# Patient Record
Sex: MALE | Race: WHITE | Employment: UNEMPLOYED | URBAN - METROPOLITAN AREA
[De-identification: names, ages, dates, MRNs, and addresses within clinical notes are randomized per-mention and may not be internally consistent; named-entity substitution may affect disease eponyms.]

---

## 2017-01-01 ENCOUNTER — HOSPITAL ENCOUNTER (OUTPATIENT)
Facility: CLINIC | Age: 62
Discharge: HOME | End: 2017-03-09
Attending: INTERNAL MEDICINE | Admitting: INTERNAL MEDICINE
Payer: MEDICARE

## 2017-01-01 ENCOUNTER — CARE COORDINATION (OUTPATIENT)
Dept: CARDIOLOGY | Facility: CLINIC | Age: 62
End: 2017-01-01

## 2017-01-01 ENCOUNTER — TELEPHONE (OUTPATIENT)
Dept: TRANSPLANT | Facility: CLINIC | Age: 62
End: 2017-01-01

## 2017-01-01 ENCOUNTER — ALLIED HEALTH/NURSE VISIT (OUTPATIENT)
Dept: TRANSPLANT | Facility: CLINIC | Age: 62
End: 2017-01-01
Attending: INTERNAL MEDICINE
Payer: MEDICARE

## 2017-01-01 ENCOUNTER — HOSPITAL ENCOUNTER (OUTPATIENT)
Dept: CARDIOLOGY | Facility: CLINIC | Age: 62
Discharge: HOME | End: 2017-01-31
Attending: INTERNAL MEDICINE | Admitting: INTERNAL MEDICINE
Payer: MEDICARE

## 2017-01-01 ENCOUNTER — PRE VISIT (OUTPATIENT)
Dept: CARDIOLOGY | Facility: CLINIC | Age: 62
End: 2017-01-01

## 2017-01-01 ENCOUNTER — OFFICE VISIT (OUTPATIENT)
Dept: CARDIOLOGY | Facility: CLINIC | Age: 62
End: 2017-01-01
Attending: THORACIC SURGERY (CARDIOTHORACIC VASCULAR SURGERY)
Payer: MEDICARE

## 2017-01-01 ENCOUNTER — TEAM CONFERENCE (OUTPATIENT)
Dept: TRANSPLANT | Facility: CLINIC | Age: 62
End: 2017-01-01

## 2017-01-01 ENCOUNTER — HOSPITAL ENCOUNTER (OUTPATIENT)
Dept: CARDIOLOGY | Facility: CLINIC | Age: 62
Discharge: HOME | End: 2017-01-03
Attending: PHYSICIAN ASSISTANT | Admitting: PHYSICIAN ASSISTANT
Payer: MEDICARE

## 2017-01-01 ENCOUNTER — HOSPITAL ENCOUNTER (OUTPATIENT)
Dept: NUCLEAR MEDICINE | Facility: CLINIC | Age: 62
Setting detail: NUCLEAR MEDICINE
End: 2017-01-31
Attending: INTERNAL MEDICINE
Payer: MEDICARE

## 2017-01-01 ENCOUNTER — OFFICE VISIT (OUTPATIENT)
Dept: CARDIOLOGY | Facility: CLINIC | Age: 62
End: 2017-01-01
Attending: INTERNAL MEDICINE
Payer: MEDICARE

## 2017-01-01 ENCOUNTER — APPOINTMENT (OUTPATIENT)
Dept: MEDSURG UNIT | Facility: CLINIC | Age: 62
End: 2017-01-01
Attending: INTERNAL MEDICINE
Payer: MEDICARE

## 2017-01-01 ENCOUNTER — DOCUMENTATION ONLY (OUTPATIENT)
Dept: TRANSPLANT | Facility: CLINIC | Age: 62
End: 2017-01-01

## 2017-01-01 VITALS
BODY MASS INDEX: 23.71 KG/M2 | DIASTOLIC BLOOD PRESSURE: 74 MMHG | HEIGHT: 69 IN | HEART RATE: 89 BPM | OXYGEN SATURATION: 93 % | SYSTOLIC BLOOD PRESSURE: 123 MMHG | WEIGHT: 160.1 LBS

## 2017-01-01 VITALS
HEIGHT: 69 IN | TEMPERATURE: 96.1 F | WEIGHT: 150 LBS | BODY MASS INDEX: 22.22 KG/M2 | OXYGEN SATURATION: 94 % | HEART RATE: 96 BPM | RESPIRATION RATE: 18 BRPM | SYSTOLIC BLOOD PRESSURE: 140 MMHG | DIASTOLIC BLOOD PRESSURE: 86 MMHG

## 2017-01-01 VITALS
HEIGHT: 69 IN | HEART RATE: 93 BPM | DIASTOLIC BLOOD PRESSURE: 87 MMHG | BODY MASS INDEX: 23.33 KG/M2 | WEIGHT: 157.5 LBS | OXYGEN SATURATION: 95 % | SYSTOLIC BLOOD PRESSURE: 153 MMHG

## 2017-01-01 DIAGNOSIS — I25.10 CORONARY ARTERY DISEASE DUE TO LIPID RICH PLAQUE: ICD-10-CM

## 2017-01-01 DIAGNOSIS — Z76.82 ORGAN TRANSPLANT CANDIDATE: Primary | ICD-10-CM

## 2017-01-01 DIAGNOSIS — N18.6 END STAGE RENAL DISEASE (H): Primary | ICD-10-CM

## 2017-01-01 DIAGNOSIS — Z76.82 AWAITING ORGAN TRANSPLANT: Primary | ICD-10-CM

## 2017-01-01 DIAGNOSIS — Z01.810 PRE-OPERATIVE CARDIOVASCULAR EXAMINATION: ICD-10-CM

## 2017-01-01 DIAGNOSIS — Z01.818 PRE-TRANSPLANT EVALUATION FOR KIDNEY TRANSPLANT: ICD-10-CM

## 2017-01-01 DIAGNOSIS — N18.6 END STAGE RENAL DISEASE (H): ICD-10-CM

## 2017-01-01 DIAGNOSIS — Z98.890 STATUS POST CORONARY ANGIOGRAM: ICD-10-CM

## 2017-01-01 DIAGNOSIS — Z76.82 ORGAN TRANSPLANT CANDIDATE: ICD-10-CM

## 2017-01-01 DIAGNOSIS — I25.83 CORONARY ARTERY DISEASE DUE TO LIPID RICH PLAQUE: ICD-10-CM

## 2017-01-01 DIAGNOSIS — I25.10 CORONARY ARTERY DISEASE INVOLVING NATIVE CORONARY ARTERY OF NATIVE HEART WITHOUT ANGINA PECTORIS: Primary | ICD-10-CM

## 2017-01-01 DIAGNOSIS — Z12.5 PROSTATE CANCER SCREENING: ICD-10-CM

## 2017-01-01 DIAGNOSIS — R94.39 ABNORMAL CARDIOVASCULAR STRESS TEST: ICD-10-CM

## 2017-01-01 DIAGNOSIS — R94.39 ABNORMAL CARDIOVASCULAR STRESS TEST: Primary | ICD-10-CM

## 2017-01-01 DIAGNOSIS — I25.83 CORONARY ATHEROSCLEROSIS DUE TO LIPID RICH PLAQUE: ICD-10-CM

## 2017-01-01 LAB
ANION GAP SERPL CALCULATED.3IONS-SCNC: 11 MMOL/L (ref 3–14)
APTT PPP: 31 SEC (ref 22–37)
BUN SERPL-MCNC: 34 MG/DL (ref 7–30)
CALCIUM SERPL-MCNC: 8.8 MG/DL (ref 8.5–10.1)
CHLORIDE SERPL-SCNC: 98 MMOL/L (ref 94–109)
CO2 SERPL-SCNC: 31 MMOL/L (ref 20–32)
CREAT SERPL-MCNC: 5 MG/DL (ref 0.66–1.25)
ERYTHROCYTE [DISTWIDTH] IN BLOOD BY AUTOMATED COUNT: 16.6 % (ref 10–15)
GFR SERPL CREATININE-BSD FRML MDRD: 12 ML/MIN/1.7M2
GLUCOSE SERPL-MCNC: 77 MG/DL (ref 70–99)
HCT VFR BLD AUTO: 36.6 % (ref 40–53)
HGB BLD-MCNC: 12 G/DL (ref 13.3–17.7)
INR PPP: 1.21 (ref 0.86–1.14)
INTERPRETATION ECG - MUSE: NORMAL
KCT BLD-ACNC: 173 SEC (ref 105–167)
KCT BLD-ACNC: 196 SEC (ref 105–167)
KCT BLD-ACNC: 257 SEC (ref 105–167)
MCH RBC QN AUTO: 33.9 PG (ref 26.5–33)
MCHC RBC AUTO-ENTMCNC: 32.8 G/DL (ref 31.5–36.5)
MCV RBC AUTO: 103 FL (ref 78–100)
PLATELET # BLD AUTO: 229 10E9/L (ref 150–450)
POTASSIUM SERPL-SCNC: 4.8 MMOL/L (ref 3.4–5.3)
RBC # BLD AUTO: 3.54 10E12/L (ref 4.4–5.9)
SODIUM SERPL-SCNC: 140 MMOL/L (ref 133–144)
WBC # BLD AUTO: 5.1 10E9/L (ref 4–11)

## 2017-01-01 PROCEDURE — 27210742 ZZH CATH CR1

## 2017-01-01 PROCEDURE — 78452 HT MUSCLE IMAGE SPECT MULT: CPT

## 2017-01-01 PROCEDURE — 78452 HT MUSCLE IMAGE SPECT MULT: CPT | Performed by: INTERNAL MEDICINE

## 2017-01-01 PROCEDURE — 40000141 ZZH STATISTIC PERIPHERAL IV START W/O US GUIDANCE

## 2017-01-01 PROCEDURE — 25000125 ZZHC RX 250: Performed by: INTERNAL MEDICINE

## 2017-01-01 PROCEDURE — 93571 IV DOP VEL&/PRESS C FLO 1ST: CPT

## 2017-01-01 PROCEDURE — 27211089 ZZH KIT ACIST INJECTOR CR3

## 2017-01-01 PROCEDURE — C1769 GUIDE WIRE: HCPCS

## 2017-01-01 PROCEDURE — 27210856 ZZH ACCESS HEART CATH CR2

## 2017-01-01 PROCEDURE — 40000264 ECHO DOBUTAMINE STRESS TEST WITH DEFINITY

## 2017-01-01 PROCEDURE — 93572 IV DOP VEL&/PRESS C FLO EA: CPT

## 2017-01-01 PROCEDURE — 25000128 H RX IP 250 OP 636: Performed by: INTERNAL MEDICINE

## 2017-01-01 PROCEDURE — 85027 COMPLETE CBC AUTOMATED: CPT | Performed by: INTERNAL MEDICINE

## 2017-01-01 PROCEDURE — 34300033 ZZH RX 343: Performed by: INTERNAL MEDICINE

## 2017-01-01 PROCEDURE — 93325 DOPPLER ECHO COLOR FLOW MAPG: CPT | Mod: 26 | Performed by: INTERNAL MEDICINE

## 2017-01-01 PROCEDURE — 93018 CV STRESS TEST I&R ONLY: CPT | Performed by: INTERNAL MEDICINE

## 2017-01-01 PROCEDURE — 4A033BC MEASUREMENT OF ARTERIAL PRESSURE, CORONARY, PERCUTANEOUS APPROACH: ICD-10-PCS | Performed by: INTERNAL MEDICINE

## 2017-01-01 PROCEDURE — 25500064 ZZH RX 255 OP 636: Performed by: INTERNAL MEDICINE

## 2017-01-01 PROCEDURE — C1887 CATHETER, GUIDING: HCPCS

## 2017-01-01 PROCEDURE — 99214 OFFICE O/P EST MOD 30 MIN: CPT | Mod: 25 | Performed by: INTERNAL MEDICINE

## 2017-01-01 PROCEDURE — 99213 OFFICE O/P EST LOW 20 MIN: CPT | Mod: 25,ZF

## 2017-01-01 PROCEDURE — 93017 CV STRESS TEST TRACING ONLY: CPT

## 2017-01-01 PROCEDURE — 93321 DOPPLER ECHO F-UP/LMTD STD: CPT | Mod: 26 | Performed by: INTERNAL MEDICINE

## 2017-01-01 PROCEDURE — 99213 OFFICE O/P EST LOW 20 MIN: CPT | Mod: ZF

## 2017-01-01 PROCEDURE — 85610 PROTHROMBIN TIME: CPT | Performed by: INTERNAL MEDICINE

## 2017-01-01 PROCEDURE — B2111ZZ FLUOROSCOPY OF MULTIPLE CORONARY ARTERIES USING LOW OSMOLAR CONTRAST: ICD-10-PCS | Performed by: INTERNAL MEDICINE

## 2017-01-01 PROCEDURE — 99153 MOD SED SAME PHYS/QHP EA: CPT | Performed by: INTERNAL MEDICINE

## 2017-01-01 PROCEDURE — 80048 BASIC METABOLIC PNL TOTAL CA: CPT | Performed by: INTERNAL MEDICINE

## 2017-01-01 PROCEDURE — C1894 INTRO/SHEATH, NON-LASER: HCPCS

## 2017-01-01 PROCEDURE — 85347 COAGULATION TIME ACTIVATED: CPT

## 2017-01-01 PROCEDURE — 93350 STRESS TTE ONLY: CPT | Mod: 26 | Performed by: INTERNAL MEDICINE

## 2017-01-01 PROCEDURE — 27210946 ZZH KIT HC TOTES DISP CR8

## 2017-01-01 PROCEDURE — 93010 ELECTROCARDIOGRAM REPORT: CPT | Performed by: INTERNAL MEDICINE

## 2017-01-01 PROCEDURE — 93454 CORONARY ARTERY ANGIO S&I: CPT | Mod: 26 | Performed by: INTERNAL MEDICINE

## 2017-01-01 PROCEDURE — 40000172 ZZH STATISTIC PROCEDURE PREP ONLY

## 2017-01-01 PROCEDURE — 99152 MOD SED SAME PHYS/QHP 5/>YRS: CPT

## 2017-01-01 PROCEDURE — 40000065 ZZH STATISTIC EKG NON-CHARGEABLE

## 2017-01-01 PROCEDURE — 93016 CV STRESS TEST SUPVJ ONLY: CPT | Performed by: INTERNAL MEDICINE

## 2017-01-01 PROCEDURE — 27210807 ZZH SHEATH CR6

## 2017-01-01 PROCEDURE — 93571 IV DOP VEL&/PRESS C FLO 1ST: CPT | Mod: 26 | Performed by: INTERNAL MEDICINE

## 2017-01-01 PROCEDURE — 85730 THROMBOPLASTIN TIME PARTIAL: CPT | Performed by: INTERNAL MEDICINE

## 2017-01-01 PROCEDURE — 93454 CORONARY ARTERY ANGIO S&I: CPT

## 2017-01-01 PROCEDURE — 99152 MOD SED SAME PHYS/QHP 5/>YRS: CPT | Performed by: INTERNAL MEDICINE

## 2017-01-01 PROCEDURE — 99153 MOD SED SAME PHYS/QHP EA: CPT

## 2017-01-01 PROCEDURE — A9502 TC99M TETROFOSMIN: HCPCS | Performed by: INTERNAL MEDICINE

## 2017-01-01 PROCEDURE — 27210787 ZZH MANIFOLD CR2

## 2017-01-01 RX ORDER — DEXTROSE MONOHYDRATE 25 G/50ML
12.5-5 INJECTION, SOLUTION INTRAVENOUS EVERY 30 MIN PRN
Status: DISCONTINUED | OUTPATIENT
Start: 2017-01-01 | End: 2017-01-01 | Stop reason: HOSPADM

## 2017-01-01 RX ORDER — FLUMAZENIL 0.1 MG/ML
0.2 INJECTION, SOLUTION INTRAVENOUS
Status: DISCONTINUED | OUTPATIENT
Start: 2017-01-01 | End: 2017-01-01 | Stop reason: HOSPADM

## 2017-01-01 RX ORDER — ASPIRIN 81 MG/1
81 TABLET ORAL DAILY
Status: DISCONTINUED | OUTPATIENT
Start: 2017-01-01 | End: 2017-01-01 | Stop reason: HOSPADM

## 2017-01-01 RX ORDER — ASPIRIN 325 MG
325 TABLET ORAL
Status: DISCONTINUED | OUTPATIENT
Start: 2017-01-01 | End: 2017-01-01 | Stop reason: HOSPADM

## 2017-01-01 RX ORDER — DOPAMINE HYDROCHLORIDE 160 MG/100ML
2-20 INJECTION, SOLUTION INTRAVENOUS CONTINUOUS PRN
Status: DISCONTINUED | OUTPATIENT
Start: 2017-01-01 | End: 2017-01-01 | Stop reason: HOSPADM

## 2017-01-01 RX ORDER — PRAVASTATIN SODIUM 20 MG
20 TABLET ORAL EVERY EVENING
Qty: 30 TABLET | Refills: 1 | Status: SHIPPED
Start: 2017-01-01

## 2017-01-01 RX ORDER — SODIUM CHLORIDE 9 MG/ML
INJECTION, SOLUTION INTRAVENOUS CONTINUOUS
Status: DISCONTINUED | OUTPATIENT
Start: 2017-01-01 | End: 2017-01-01 | Stop reason: HOSPADM

## 2017-01-01 RX ORDER — IOPAMIDOL 755 MG/ML
150 INJECTION, SOLUTION INTRAVASCULAR ONCE
Status: COMPLETED | OUTPATIENT
Start: 2017-01-01 | End: 2017-01-01

## 2017-01-01 RX ORDER — CLOPIDOGREL BISULFATE 75 MG/1
300-600 TABLET ORAL
Status: DISCONTINUED | OUTPATIENT
Start: 2017-01-01 | End: 2017-01-01 | Stop reason: HOSPADM

## 2017-01-01 RX ORDER — NITROGLYCERIN 5 MG/ML
100-500 VIAL (ML) INTRAVENOUS
Status: DISCONTINUED | OUTPATIENT
Start: 2017-01-01 | End: 2017-01-01 | Stop reason: HOSPADM

## 2017-01-01 RX ORDER — REGADENOSON 0.08 MG/ML
0.4 INJECTION, SOLUTION INTRAVENOUS ONCE
Status: COMPLETED | OUTPATIENT
Start: 2017-01-01 | End: 2017-01-01

## 2017-01-01 RX ORDER — SODIUM CHLORIDE 9 MG/ML
INJECTION, SOLUTION INTRAVENOUS CONTINUOUS
Status: CANCELLED | OUTPATIENT
Start: 2017-01-01

## 2017-01-01 RX ORDER — POTASSIUM CHLORIDE 7.45 MG/ML
10 INJECTION INTRAVENOUS
Status: DISCONTINUED | OUTPATIENT
Start: 2017-01-01 | End: 2017-01-01 | Stop reason: HOSPADM

## 2017-01-01 RX ORDER — ARGATROBAN 1 MG/ML
350 INJECTION, SOLUTION INTRAVENOUS
Status: DISCONTINUED | OUTPATIENT
Start: 2017-01-01 | End: 2017-01-01 | Stop reason: HOSPADM

## 2017-01-01 RX ORDER — HEPARIN SODIUM 1000 [USP'U]/ML
1000-10000 INJECTION, SOLUTION INTRAVENOUS; SUBCUTANEOUS EVERY 5 MIN PRN
Status: DISCONTINUED | OUTPATIENT
Start: 2017-01-01 | End: 2017-01-01 | Stop reason: HOSPADM

## 2017-01-01 RX ORDER — PROTAMINE SULFATE 10 MG/ML
1-5 INJECTION, SOLUTION INTRAVENOUS
Status: DISCONTINUED | OUTPATIENT
Start: 2017-01-01 | End: 2017-01-01 | Stop reason: HOSPADM

## 2017-01-01 RX ORDER — ALBUTEROL SULFATE 90 UG/1
2 AEROSOL, METERED RESPIRATORY (INHALATION) EVERY 5 MIN PRN
Status: DISCONTINUED | OUTPATIENT
Start: 2017-01-01 | End: 2017-01-01 | Stop reason: HOSPADM

## 2017-01-01 RX ORDER — ARGATROBAN 1 MG/ML
150 INJECTION, SOLUTION INTRAVENOUS
Status: DISCONTINUED | OUTPATIENT
Start: 2017-01-01 | End: 2017-01-01 | Stop reason: HOSPADM

## 2017-01-01 RX ORDER — PROTAMINE SULFATE 10 MG/ML
25-100 INJECTION, SOLUTION INTRAVENOUS EVERY 5 MIN PRN
Status: DISCONTINUED | OUTPATIENT
Start: 2017-01-01 | End: 2017-01-01 | Stop reason: HOSPADM

## 2017-01-01 RX ORDER — NALOXONE HYDROCHLORIDE 0.4 MG/ML
.1-.4 INJECTION, SOLUTION INTRAMUSCULAR; INTRAVENOUS; SUBCUTANEOUS
Status: DISCONTINUED | OUTPATIENT
Start: 2017-01-01 | End: 2017-01-01 | Stop reason: HOSPADM

## 2017-01-01 RX ORDER — LORAZEPAM 2 MG/ML
.5-2 INJECTION INTRAMUSCULAR EVERY 4 HOURS PRN
Status: DISCONTINUED | OUTPATIENT
Start: 2017-01-01 | End: 2017-01-01 | Stop reason: HOSPADM

## 2017-01-01 RX ORDER — EPTIFIBATIDE 2 MG/ML
180 INJECTION, SOLUTION INTRAVENOUS EVERY 10 MIN PRN
Status: DISCONTINUED | OUTPATIENT
Start: 2017-01-01 | End: 2017-01-01 | Stop reason: HOSPADM

## 2017-01-01 RX ORDER — ONDANSETRON 2 MG/ML
4 INJECTION INTRAMUSCULAR; INTRAVENOUS EVERY 4 HOURS PRN
Status: DISCONTINUED | OUTPATIENT
Start: 2017-01-01 | End: 2017-01-01 | Stop reason: HOSPADM

## 2017-01-01 RX ORDER — METHYLPREDNISOLONE SODIUM SUCCINATE 125 MG/2ML
125 INJECTION, POWDER, LYOPHILIZED, FOR SOLUTION INTRAMUSCULAR; INTRAVENOUS
Status: DISCONTINUED | OUTPATIENT
Start: 2017-01-01 | End: 2017-01-01 | Stop reason: HOSPADM

## 2017-01-01 RX ORDER — ENALAPRILAT 1.25 MG/ML
1.25-2.5 INJECTION INTRAVENOUS
Status: DISCONTINUED | OUTPATIENT
Start: 2017-01-01 | End: 2017-01-01 | Stop reason: HOSPADM

## 2017-01-01 RX ORDER — ASPIRIN 81 MG/1
81-324 TABLET, CHEWABLE ORAL
Status: DISCONTINUED | OUTPATIENT
Start: 2017-01-01 | End: 2017-01-01 | Stop reason: HOSPADM

## 2017-01-01 RX ORDER — FUROSEMIDE 10 MG/ML
20-100 INJECTION INTRAMUSCULAR; INTRAVENOUS
Status: DISCONTINUED | OUTPATIENT
Start: 2017-01-01 | End: 2017-01-01 | Stop reason: HOSPADM

## 2017-01-01 RX ORDER — NITROGLYCERIN 5 MG/ML
100-200 VIAL (ML) INTRAVENOUS
Status: DISCONTINUED | OUTPATIENT
Start: 2017-01-01 | End: 2017-01-01 | Stop reason: HOSPADM

## 2017-01-01 RX ORDER — ADENOSINE 3 MG/ML
12-12000 INJECTION, SOLUTION INTRAVENOUS
Status: DISCONTINUED | OUTPATIENT
Start: 2017-01-01 | End: 2017-01-01 | Stop reason: HOSPADM

## 2017-01-01 RX ORDER — PRAVASTATIN SODIUM 20 MG
20 TABLET ORAL EVERY EVENING
Status: DISCONTINUED | OUTPATIENT
Start: 2017-01-01 | End: 2017-01-01 | Stop reason: HOSPADM

## 2017-01-01 RX ORDER — NITROGLYCERIN 20 MG/100ML
.07-2 INJECTION INTRAVENOUS CONTINUOUS PRN
Status: DISCONTINUED | OUTPATIENT
Start: 2017-01-01 | End: 2017-01-01 | Stop reason: HOSPADM

## 2017-01-01 RX ORDER — NALOXONE HYDROCHLORIDE 0.4 MG/ML
.2-.4 INJECTION, SOLUTION INTRAMUSCULAR; INTRAVENOUS; SUBCUTANEOUS
Status: DISCONTINUED | OUTPATIENT
Start: 2017-01-01 | End: 2017-01-01 | Stop reason: HOSPADM

## 2017-01-01 RX ORDER — POTASSIUM CHLORIDE 29.8 MG/ML
20 INJECTION INTRAVENOUS
Status: DISCONTINUED | OUTPATIENT
Start: 2017-01-01 | End: 2017-01-01 | Stop reason: HOSPADM

## 2017-01-01 RX ORDER — DOBUTAMINE HYDROCHLORIDE 200 MG/100ML
2-20 INJECTION INTRAVENOUS CONTINUOUS PRN
Status: DISCONTINUED | OUTPATIENT
Start: 2017-01-01 | End: 2017-01-01 | Stop reason: HOSPADM

## 2017-01-01 RX ORDER — LIDOCAINE HYDROCHLORIDE 10 MG/ML
30 INJECTION, SOLUTION EPIDURAL; INFILTRATION; INTRACAUDAL; PERINEURAL
Status: DISCONTINUED | OUTPATIENT
Start: 2017-01-01 | End: 2017-01-01 | Stop reason: HOSPADM

## 2017-01-01 RX ORDER — DOBUTAMINE HYDROCHLORIDE 200 MG/100ML
5-40 INJECTION INTRAVENOUS CONTINUOUS PRN
Status: COMPLETED | OUTPATIENT
Start: 2017-01-01 | End: 2017-01-01

## 2017-01-01 RX ORDER — PRASUGREL 10 MG/1
10-60 TABLET, FILM COATED ORAL
Status: DISCONTINUED | OUTPATIENT
Start: 2017-01-01 | End: 2017-01-01 | Stop reason: HOSPADM

## 2017-01-01 RX ORDER — NICARDIPINE HYDROCHLORIDE 2.5 MG/ML
100 INJECTION INTRAVENOUS
Status: DISCONTINUED | OUTPATIENT
Start: 2017-01-01 | End: 2017-01-01 | Stop reason: HOSPADM

## 2017-01-01 RX ORDER — AMINOPHYLLINE 25 MG/ML
50-100 INJECTION, SOLUTION INTRAVENOUS
Status: DISCONTINUED | OUTPATIENT
Start: 2017-01-01 | End: 2017-01-01 | Stop reason: HOSPADM

## 2017-01-01 RX ORDER — HYDROCODONE BITARTRATE AND ACETAMINOPHEN 5; 325 MG/1; MG/1
1-2 TABLET ORAL EVERY 4 HOURS PRN
Status: DISCONTINUED | OUTPATIENT
Start: 2017-01-01 | End: 2017-01-01 | Stop reason: HOSPADM

## 2017-01-01 RX ORDER — VERAPAMIL HYDROCHLORIDE 2.5 MG/ML
1-2.5 INJECTION, SOLUTION INTRAVENOUS
Status: DISCONTINUED | OUTPATIENT
Start: 2017-01-01 | End: 2017-01-01 | Stop reason: HOSPADM

## 2017-01-01 RX ORDER — SODIUM NITROPRUSSIDE 25 MG/ML
100-200 INJECTION INTRAVENOUS
Status: DISCONTINUED | OUTPATIENT
Start: 2017-01-01 | End: 2017-01-01 | Stop reason: HOSPADM

## 2017-01-01 RX ORDER — ACETAMINOPHEN 325 MG/1
325-650 TABLET ORAL EVERY 4 HOURS PRN
Status: DISCONTINUED | OUTPATIENT
Start: 2017-01-01 | End: 2017-01-01 | Stop reason: HOSPADM

## 2017-01-01 RX ORDER — FENTANYL CITRATE 50 UG/ML
25-50 INJECTION, SOLUTION INTRAMUSCULAR; INTRAVENOUS
Status: DISCONTINUED | OUTPATIENT
Start: 2017-01-01 | End: 2017-01-01 | Stop reason: HOSPADM

## 2017-01-01 RX ORDER — LIDOCAINE 40 MG/G
CREAM TOPICAL
Status: DISCONTINUED | OUTPATIENT
Start: 2017-01-01 | End: 2017-01-01 | Stop reason: HOSPADM

## 2017-01-01 RX ORDER — ACYCLOVIR 200 MG/1
0-1 CAPSULE ORAL
Status: DISCONTINUED | OUTPATIENT
Start: 2017-01-01 | End: 2017-01-01 | Stop reason: HOSPADM

## 2017-01-01 RX ORDER — PROMETHAZINE HYDROCHLORIDE 25 MG/ML
6.25-25 INJECTION, SOLUTION INTRAMUSCULAR; INTRAVENOUS EVERY 4 HOURS PRN
Status: DISCONTINUED | OUTPATIENT
Start: 2017-01-01 | End: 2017-01-01 | Stop reason: HOSPADM

## 2017-01-01 RX ORDER — NIFEDIPINE 10 MG/1
10 CAPSULE ORAL
Status: DISCONTINUED | OUTPATIENT
Start: 2017-01-01 | End: 2017-01-01 | Stop reason: HOSPADM

## 2017-01-01 RX ORDER — PHENYLEPHRINE HCL IN 0.9% NACL 1 MG/10 ML
20-100 SYRINGE (ML) INTRAVENOUS
Status: DISCONTINUED | OUTPATIENT
Start: 2017-01-01 | End: 2017-01-01 | Stop reason: HOSPADM

## 2017-01-01 RX ORDER — EPTIFIBATIDE 2 MG/ML
2 INJECTION, SOLUTION INTRAVENOUS CONTINUOUS PRN
Status: DISCONTINUED | OUTPATIENT
Start: 2017-01-01 | End: 2017-01-01 | Stop reason: HOSPADM

## 2017-01-01 RX ORDER — LIDOCAINE 40 MG/G
CREAM TOPICAL
Status: CANCELLED | OUTPATIENT
Start: 2017-01-01

## 2017-01-01 RX ORDER — NALOXONE HYDROCHLORIDE 0.4 MG/ML
0.4 INJECTION, SOLUTION INTRAMUSCULAR; INTRAVENOUS; SUBCUTANEOUS EVERY 5 MIN PRN
Status: DISCONTINUED | OUTPATIENT
Start: 2017-01-01 | End: 2017-01-01 | Stop reason: HOSPADM

## 2017-01-01 RX ORDER — HYDRALAZINE HYDROCHLORIDE 20 MG/ML
10-20 INJECTION INTRAMUSCULAR; INTRAVENOUS
Status: DISCONTINUED | OUTPATIENT
Start: 2017-01-01 | End: 2017-01-01 | Stop reason: HOSPADM

## 2017-01-01 RX ORDER — DIPHENHYDRAMINE HYDROCHLORIDE 50 MG/ML
25-50 INJECTION INTRAMUSCULAR; INTRAVENOUS
Status: DISCONTINUED | OUTPATIENT
Start: 2017-01-01 | End: 2017-01-01 | Stop reason: HOSPADM

## 2017-01-01 RX ORDER — NITROGLYCERIN 0.4 MG/1
0.4 TABLET SUBLINGUAL EVERY 5 MIN PRN
Status: DISCONTINUED | OUTPATIENT
Start: 2017-01-01 | End: 2017-01-01 | Stop reason: HOSPADM

## 2017-01-01 RX ORDER — CLOPIDOGREL BISULFATE 75 MG/1
75 TABLET ORAL
Status: DISCONTINUED | OUTPATIENT
Start: 2017-01-01 | End: 2017-01-01 | Stop reason: HOSPADM

## 2017-01-01 RX ADMIN — IOPAMIDOL 180 ML: 755 INJECTION, SOLUTION INTRAVASCULAR at 12:30

## 2017-01-01 RX ADMIN — FENTANYL CITRATE 50 MCG: 50 INJECTION, SOLUTION INTRAMUSCULAR; INTRAVENOUS at 16:33

## 2017-01-01 RX ADMIN — TETROFOSMIN 11 MCI.: 0.23 INJECTION, POWDER, LYOPHILIZED, FOR SOLUTION INTRAVENOUS at 10:04

## 2017-01-01 RX ADMIN — SODIUM CHLORIDE: 900 INJECTION, SOLUTION INTRAVENOUS at 14:38

## 2017-01-01 RX ADMIN — DOBUTAMINE HYDROCHLORIDE 30 MCG/KG/MIN: 200 INJECTION INTRAVENOUS at 11:32

## 2017-01-01 RX ADMIN — FENTANYL CITRATE 50 MCG: 50 INJECTION, SOLUTION INTRAMUSCULAR; INTRAVENOUS at 12:59

## 2017-01-01 RX ADMIN — ATROPINE SULFATE 2 MG: 0.4 INJECTION, SOLUTION INTRAMUSCULAR; INTRAVENOUS; SUBCUTANEOUS at 11:42

## 2017-01-01 RX ADMIN — FENTANYL CITRATE 50 MCG: 50 INJECTION, SOLUTION INTRAMUSCULAR; INTRAVENOUS at 12:43

## 2017-01-01 RX ADMIN — NITROGLYCERIN 200 MCG: 5 INJECTION, SOLUTION INTRAVENOUS at 12:58

## 2017-01-01 RX ADMIN — MIDAZOLAM 1 MG: 1 INJECTION INTRAMUSCULAR; INTRAVENOUS at 16:13

## 2017-01-01 RX ADMIN — METOPROLOL TARTRATE 2 MG: 5 INJECTION INTRAVENOUS at 11:44

## 2017-01-01 RX ADMIN — Medication 5400 UNITS: at 13:15

## 2017-01-01 RX ADMIN — REGADENOSON 0.4 MG: 0.08 INJECTION, SOLUTION INTRAVENOUS at 11:05

## 2017-01-01 RX ADMIN — Medication 4000 UNITS: at 13:28

## 2017-01-01 RX ADMIN — MIDAZOLAM HYDROCHLORIDE 1 MG: 1 INJECTION, SOLUTION INTRAMUSCULAR; INTRAVENOUS at 13:23

## 2017-01-01 RX ADMIN — MIDAZOLAM HYDROCHLORIDE 1 MG: 1 INJECTION, SOLUTION INTRAMUSCULAR; INTRAVENOUS at 12:43

## 2017-01-01 RX ADMIN — TETROFOSMIN 41 MCI.: 0.23 INJECTION, POWDER, LYOPHILIZED, FOR SOLUTION INTRAVENOUS at 12:12

## 2017-01-01 RX ADMIN — SODIUM CHLORIDE: 900 INJECTION, SOLUTION INTRAVENOUS at 11:43

## 2017-01-01 RX ADMIN — PERFLUTREN 5 ML: 6.52 INJECTION, SUSPENSION INTRAVENOUS at 11:47

## 2017-01-01 RX ADMIN — FENTANYL CITRATE 50 MCG: 50 INJECTION, SOLUTION INTRAMUSCULAR; INTRAVENOUS at 16:09

## 2017-01-01 ASSESSMENT — PAIN SCALES - GENERAL
PAINLEVEL: NO PAIN (0)
PAINLEVEL: NO PAIN (0)

## 2017-01-03 NOTE — NURSING NOTE
Chief Complaint   Patient presents with     Eval/Assessment     Cardiac prior to possible kidney transplant   note- Patients BP is elevated today; patient did take his BP med this morning

## 2017-01-03 NOTE — PROGRESS NOTES
SUBJECTIVE:  Mauricio Galo is a 61 year old male who presents for evaluation to maintain renal Tx waitlist status.  CKD stage 5 of unclear etiology. No DM. Heavy smoker.  Known PAD. Known CVA.  Currently active with no symptoms.  HIV positive for long time.    Patient Active Problem List    Diagnosis Date Noted     HTN (hypertension)      Priority: Medium     End stage kidney disease (H)      Priority: Medium     Kidney transplant status, cadaveric 03/17/2014     Priority: Medium     HCV (hepatitis C virus) 03/17/2014     Priority: Medium     ESRD (end stage renal disease) (H) 03/17/2014     Priority: Medium     Organ transplant candidate 09/19/2013     Priority: Medium     CKD (chronic kidney disease) 07/26/2013     Priority: Medium     HIV (human immunodeficiency virus infection) (H) 07/26/2013     Priority: Medium     COPD (chronic obstructive pulmonary disease) (H) 07/26/2013     Priority: Medium     PVD (peripheral vascular disease) (H) 07/26/2013     Priority: Medium     Anemia 07/26/2013     Priority: Medium     Hx SBO 08/01/2012     Priority: Medium     recurrent       Thrombocytosis (H) 05/01/2012     Priority: Medium     Wound dehiscence 05/01/2012     Priority: Medium    .  Current Outpatient Prescriptions   Medication Sig     lamiVUDine (EPIVIR) 10 MG/ML solution 10mg/ml oral solution:  noah instructed to take as follows: 2.5ml by mouth every day.     diltiazem (CARTIA XT) 180 MG 24 hr CD capsule Take by mouth daily      rilpivirine (EDURANT) 25 MG tablet Take 2.5 mg by mouth daily      abacavir (ZIAGEN) 300 MG tablet Take 300 mg by mouth 2 times daily     metoprolol (TOPROL-XL) 50 MG 24 hr tablet Take 50 mg by mouth daily.     [DISCONTINUED] NEW MED 2 tablet daily for HIV, Unsure med name     No current facility-administered medications for this visit.     Past Medical History   Diagnosis Date     CVA (cerebral infarction) 4/30/2012     complete occlusion of the proximal left internal carotid  "artery with distal reconstitution via the left opthalmic artery     HIV (human immunodeficiency virus infection) (H) dx 4/16/1997     started meds 6/24/1997     PVD (peripheral vascular disease) (H) 8/22/2006     HTN (hypertension)      CKD (chronic kidney disease) stage 3, GFR 30-59 ml/min 01/25/2012     COPD (chronic obstructive pulmonary disease) (H)      Anemia 4/30/2012     Thrombocytosis (H) 5/01/2012     Wound dehiscence 5/01/2012     Hx SBO 8/01/2012     recurrent     End stage kidney disease (H)      Anxiety      Past Surgical History   Procedure Laterality Date     Bypass graft iliac to femoral  4/5/2012     Right common iliac artery endarterectomy  4/2012     Exploratory laparotomy, small bowel resection  8/2012     Allergies   Allergen Reactions     No Known Drug Allergy      Social History     Social History     Marital Status: Single     Spouse Name: N/A     Number of Children: N/A     Years of Education: N/A     Occupational History     Not on file.     Social History Main Topics     Smoking status: Current Some Day Smoker -- 1.00 packs/day for 0 years     Types: Cigarettes     Smokeless tobacco: Never Used      Comment: stopped 2 weeks ago     Alcohol Use: No     Drug Use: No     Sexual Activity: Not on file     Other Topics Concern     Not on file     Social History Narrative     No family history on file.       REVIEW OF SYSTEMS:  General: negative, fever, chills, night sweats  Skin: negative, acne, rash and scaling  Eyes: negative, double vision, eye pain and photophobia  Ears/Nose/Throat: negative, nasal congestion and purulent rhinorrhea  Respiratory: No dyspnea on exertion, No cough, No hemoptysis and negative  Cardiovascular: negative, palpitations, tachycardia, irregular heart beat, chest pain, exertional chest pain or pressure, paroxysmal nocturnal dyspnea, dyspnea on exertion and orthopnea         OBJECTIVE:  Blood pressure 123/74, pulse 89, height 1.753 m (5' 9\"), weight 72.621 kg (160 lb " 1.6 oz), SpO2 93 %.  General Appearance: alert, active and no distress  Head: Normocephalic. No masses, lesions, tenderness or abnormalities  Eyes: conjuctiva clear, PERRL, EOM intact  Ears: External ears normal. Canals clear. TM's normal.  Nose: Nares normal  Mouth: normal  Neck: Supple, no cervical adenopathy, no thyromegaly  Lungs: clear to auscultation  Cardiac: regular rate and rhythm, normal S1 and S2, no murmur         ASSESSMENT/PLAN:  Patient with CKD stage 5.HIV+.  Prior PAD/CVA.  No DM. Heavy smoker.  Denied cardiac symptoms.  Will proceed with scheduled DSE.  Per orders.   Return to Clinic PRN.    Reviewed DSE. Non diagnostic as he did not reach target heartrate. Normal Resting images. Will plan for a Lexiscan.

## 2017-01-03 NOTE — MR AVS SNAPSHOT
After Visit Summary   1/3/2017    Mauricio Galo    MRN: [id number]           Patient Information     Date Of Birth          1955        Visit Information        Provider Department      1/3/2017 8:30 AM RANDOLPH Veloz MD [facility]        Care Instructions    Thank you for your visit today.  Please call me with any questions or concerns.   Ramos Aranda RN  Cardiology Care Coordinator  [phone], press option 1 then option 3        Follow-ups after your visit        Follow-up notes from your care team     Return if symptoms worsen or fail to improve.      Your next 10 appointments already scheduled     Jan 03, 2017  9:00 AM   (Arrive by 8:45 AM)   SOT SOCIAL WORK EVAL with SALOME Orellana   [facility]plant ([facility])    [address] [place] [id number] [phone]            Jan 03, 2017 10:00 AM   LAB with  LAB   [facility] Lab ([facility])    [address] [place] [id number] [phone]           Patient must bring picture ID.  Patient should be prepared to give a urine specimen  Please do not eat 10-12 hours before your appointment if you are coming in fasting for labs on lipids, cholesterol, or glucose (sugar).  Pregnant women should follow their Care Team instructions. Water with medications is okay. Do not drink coffee or other fluids.   If you have concerns about taking  your medications, please ask at office or if scheduling via [employer], send a message by clicking on Secure Messaging, Message Your Care Team.            Jan 03, 2017 11:00 AM   Ech Dobutamine Stress Test with [id number]   [facility] [place],  Echocardiography ([facility])    [address]  [facility] [address]   [phone]           1.  Please bring or wear a comfortable two-piece outfit and walking shoes. 2.  Stop eating 3  hours before the test. You may drink water or juice. 3.  Stop all caffeine 12 hours before the test. This includes coffee, tea, soda pop, chocolate and certain medicines (such as Anacin and Excederin). Also avoid decaf coffee and tea, as these contain small amounts of caffeine. 4.  No alcohol, smoking or use of other tobacco products for 12 hours before the test. 5.  Refer to your provider instructions to see if you need to stop any medications (such as beta-blockers or nitrates) for this test. 6.  For patients with diabetes: -   If you take insulin, call your diabetes care team. Ask if you should take a   dose the morning of your test. -   If you take diabetes medicine by mouth, don't take it on the morning of your test. Bring it with you to take after the test.  (If you have questions, call your diabetes care team) 7.  When you arrive, please tell us if: -   You have diabetes. -   You have taken Viagra, Cialis or Levitra in the past 48 hours. 8.  For any questions that cannot be answered, please contact the ordering physician            May 23, 2017  7:45 AM   Lab with  LAB   [facility] Lab ([facility])    [address] [place] [id number] [phone]            May 23, 2017  8:00 AM   US ABDOMEN/PELVIS DUPLEX COMPLETE with [address] [facility] US ([facility])    [address] [place] [address] [phone]           Please bring a list of your medicines (including vitamins, minerals and over-the-counter drugs). Also, tell your doctor about any allergies you may have. Wear comfortable clothes and leave your valuables at home.  Adults: No eating or drinking for 8 hours before the exam. You may take medicine with a small sip of water.  Children: - Children 6+ years: No food or drink for 6 hours before exam. - Children 1-5 years: No food or drink for 4 hours before exam. - Infants, breast-fed: may  "have breast milk up to 2 hours before exam. - Infants, formula: may have bottle until 4 hours before exam.  Please call the Imaging Department at your exam site with any questions.            May 23, 2017  9:00 AM   (Arrive by 8:45 AM)   Return General Liver with Didier Pugh MD   [facility] Hepatology ([facility])    [address] [facility]  [address] [facility] [address] [phone]              Who to contact     If you have questions or need follow up information about today's clinic visit or your schedule please contact [facility] directly at [phone].  Normal or non-critical lab and imaging results will be communicated to you by MyChart, letter or phone within 4 business days after the clinic has received the results. If you do not hear from us within 7 days, please contact the clinic through [employer] or phone. If you have a critical or abnormal lab result, we will notify you by phone as soon as possible.  Submit refill requests through [employer] or call your pharmacy and they will forward the refill request to us. Please allow 3 business days for your refill to be completed.          Additional Information About Your Visit        [employer] Information     [employer] gives you secure access to your electronic health record. If you see a primary care provider, you can also send messages to your care team and make appointments. If you have questions, please call your primary care clinic.  If you do not have a primary care provider, please call [phone] and they will assist you.        Your Vitals Were     Pulse Height BMI (Body Mass Index) Pulse Oximetry          89 1.753 m (5' 9\") 23.63 kg/m2 93%         Blood Pressure from Last 3 Encounters:   01/03/17 123/74   11/22/16 122/72   12/07/15 151/71    Weight from Last 3 Encounters:   01/03/17 72.621 kg (160 lb 1.6 oz)   11/22/16 73.029 kg (161 lb)   12/07/15 67.132 kg (148 lb)              Today, you had the following     No " orders found for display       Primary Care Provider Office Phone # Fax #    Adam Cordova [phone]       [facility] [address] [address]        Thank you!     Thank you for choosing [facility]  for your care. Our goal is always to provide you with excellent care. Hearing back from our patients is one way we can continue to improve our services. Please take a few minutes to complete the written survey that you may receive in the mail after your visit with us. Thank you!             Your Updated Medication List - Protect others around you: Learn how to safely use, store and throw away your medicines at www.disposemymeds.org.          This list is accurate as of: 1/3/17  8:56 AM.  Always use your most recent med list.                   Brand Name Dispense Instructions for use    abacavir 300 MG tablet    ZIAGEN     Take 300 mg by mouth 2 times daily       CARTIA  MG 24 hr capsule   Generic drug:  diltiazem      Take by mouth daily       lamiVUDine 10 MG/ML solution    EPIVIR     10mg/ml oral solution:  noah instructed to take as follows: 2.5ml by mouth every day.       metoprolol 50 MG 24 hr tablet    TOPROL-XL     Take 50 mg by mouth daily.       rilpivirine 25 MG tablet    EDURANT     Take 2.5 mg by mouth daily

## 2017-01-03 NOTE — NURSING NOTE
Med Reconcile: Reviewed and verified all current medications with the patient. The updated medication list was printed and given to the patient.  Return Appointment: PRN. Patient given instructions regarding scheduling next clinic visit. Patient demonstrated understanding of this information and agreed to call with further questions or concerns.  Patient stated he understood all health information given and agreed to call with further questions or concerns.

## 2017-01-03 NOTE — Clinical Note
1/3/2017      RE: Mauricio Galo  [address]     [place] [address]       Dear Colleague,    Thank you for the opportunity to participate in the care of your patient, Mauricio Galo, at the [facility]. Please see a copy of my visit note below.       SUBJECTIVE:  Mauricio Galo is a 61 year old male who presents for evaluation to maintain renal Tx waitlist status.  CKD stage 5 of unclear etiology. No DM. Heavy smoker.  Known PAD. Known CVA.  Currently active with no symptoms.  HIV positive for long time.    Patient Active Problem List    Diagnosis Date Noted     HTN (hypertension)      Priority: Medium     End stage kidney disease (H)      Priority: Medium     Kidney transplant status, cadaveric 03/17/2014     Priority: Medium     HCV (hepatitis C virus) 03/17/2014     Priority: Medium     ESRD (end stage renal disease) (H) 03/17/2014     Priority: Medium     Organ transplant candidate 09/19/2013     Priority: Medium     CKD (chronic kidney disease) 07/26/2013     Priority: Medium     HIV (human immunodeficiency virus infection) (H) 07/26/2013     Priority: Medium     COPD (chronic obstructive pulmonary disease) (H) 07/26/2013     Priority: Medium     PVD (peripheral vascular disease) (H) 07/26/2013     Priority: Medium     Anemia 07/26/2013     Priority: Medium     Hx SBO 08/01/2012     Priority: Medium     recurrent       Thrombocytosis (H) 05/01/2012     Priority: Medium     Wound dehiscence 05/01/2012     Priority: Medium    .  Current Outpatient Prescriptions   Medication Sig     lamiVUDine (EPIVIR) 10 MG/ML solution 10mg/ml oral solution:  noah instructed to take as follows: 2.5ml by mouth every day.     diltiazem (CARTIA XT) 180 MG 24 hr CD capsule Take by mouth daily      rilpivirine (EDURANT) 25 MG tablet Take 2.5 mg by mouth daily      abacavir (ZIAGEN) 300 MG tablet Take 300 mg by mouth 2 times daily     metoprolol  (TOPROL-XL) 50 MG 24 hr tablet Take 50 mg by mouth daily.     [DISCONTINUED] NEW MED 2 tablet daily for HIV, Unsure med name     No current facility-administered medications for this visit.     Past Medical History   Diagnosis Date     CVA (cerebral infarction) 4/30/2012     complete occlusion of the proximal left internal carotid artery with distal reconstitution via the left opthalmic artery     HIV (human immunodeficiency virus infection) (H) dx 4/16/1997     started meds 6/24/1997     PVD (peripheral vascular disease) (H) 8/22/2006     HTN (hypertension)      CKD (chronic kidney disease) stage 3, GFR 30-59 ml/min 01/25/2012     COPD (chronic obstructive pulmonary disease) (H)      Anemia 4/30/2012     Thrombocytosis (H) 5/01/2012     Wound dehiscence 5/01/2012     Hx SBO 8/01/2012     recurrent     End stage kidney disease (H)      Anxiety      Past Surgical History   Procedure Laterality Date     Bypass graft iliac to femoral  4/5/2012     Right common iliac artery endarterectomy  4/2012     Exploratory laparotomy, small bowel resection  8/2012     Allergies   Allergen Reactions     No Known Drug Allergy      Social History     Social History     Marital Status: Single     Spouse Name: N/A     Number of Children: N/A     Years of Education: N/A     Occupational History     Not on file.     Social History Main Topics     Smoking status: Current Some Day Smoker -- 1.00 packs/day for 0 years     Types: Cigarettes     Smokeless tobacco: Never Used      Comment: stopped 2 weeks ago     Alcohol Use: No     Drug Use: No     Sexual Activity: Not on file     Other Topics Concern     Not on file     Social History Narrative     No family history on file.       REVIEW OF SYSTEMS:  General: negative, fever, chills, night sweats  Skin: negative, acne, rash and scaling  Eyes: negative, double vision, eye pain and photophobia  Ears/Nose/Throat: negative, nasal congestion and purulent rhinorrhea  Respiratory: No dyspnea on  "exertion, No cough, No hemoptysis and negative  Cardiovascular: negative, palpitations, tachycardia, irregular heart beat, chest pain, exertional chest pain or pressure, paroxysmal nocturnal dyspnea, dyspnea on exertion and orthopnea         OBJECTIVE:  Blood pressure 123/74, pulse 89, height 1.753 m (5' 9\"), weight 72.621 kg (160 lb 1.6 oz), SpO2 93 %.  General Appearance: alert, active and no distress  Head: Normocephalic. No masses, lesions, tenderness or abnormalities  Eyes: conjuctiva clear, PERRL, EOM intact  Ears: External ears normal. Canals clear. TM's normal.  Nose: Nares normal  Mouth: normal  Neck: Supple, no cervical adenopathy, no thyromegaly  Lungs: clear to auscultation  Cardiac: regular rate and rhythm, normal S1 and S2, no murmur         ASSESSMENT/PLAN:  Patient with CKD stage 5.HIV+.  Prior PAD/CVA.  No DM. Heavy smoker.  Denied cardiac symptoms.  Will proceed with scheduled DSE.  Per orders.   Return to Clinic PRN.    Reviewed DSE. Non diagnostic as he did not reach target heartrate. Normal Resting images. Will plan for a Lexiscan.    Please do not hesitate to contact me if you have any questions/concerns.     Sincerely,     RANDOLPH Veloz MD    "

## 2017-01-03 NOTE — PATIENT INSTRUCTIONS
Thank you for your visit today.  Please call me with any questions or concerns.   Ramos Aranda RN  Cardiology Care Coordinator  [phone], press option 1 then option 3

## 2017-01-03 NOTE — PROGRESS NOTES
Pt here for dobutamine stress test.  Test, meds and side effects reviewed with patient.  Pt did not achieve target HR of 137 (max HR reached was 115).  Pt states he took his metoprolol and diltiazem this am.  Pt given dobutamine at 50 mcg/kg for over 10 min and 2 mg atropine.  Pt returned to baseline VS with 2 mg metoprolol.   Pt escorted out to the gold waiting room.

## 2017-01-04 NOTE — PROGRESS NOTES
D: Met with Mauricio for kidney Wait List Appointment.  Initial assessment completed on 7/24/13 and update on 12/28/15.  I: Issues in the past for Mauricio have been non compliance with dialysis and limited support system.  According to outside records Mauricio has been in compliance with dialysis.  When this writer met with patient he did indicate irritation due to not being able to eat until after this stress test.  Mauricio did settle down and appeared to understand transplant requirements.  He did ask why he needs to know this now as he does not know when he will be transplanted.  Discussed importance of knowing the information prior to being transplanted.  Mauricio indicated he was not aware of need for post transplant lab draws and the frequency.  Mauricio indicated he wants to travel after his transplant.  Discussed transplant is his choice and he does not have to choose to get transplanted but if he does he is also choosing to follow all post transplant requirements.  Mauricio appeared to understand.  He continues to have a limited support system but indicated Catalino, former boyfriend, will be able to assist him with any needs post transplant.   His insurance is appropriate and he has Extra Help with his Part D.  He is still smoking a pack of cigarettes a day,  Mauricio assured this writer, even with his limited social support he will be able to follow all post transplant requirements.   Mauricio continues to be an appropriate transplant candidate.  Reviewed transplant education (Medicare, rehabilitation, donor issues, community/financial resources, and psych/family adjustment) as well as psychosocial risks of transplant.   A: Patient seemed to process information well. Appeared well informed, motivated, and able to follow post transplant requirements. Behavior was appropriate during interview. Has adequate income and insurance coverage. Adequate social support. No major contraindications noted for transplant. At this  time, patient appears to understand the risks and benefits of transplant.   P: SW to continue to follow as needed.

## 2017-01-19 NOTE — PROGRESS NOTES
PATHOLOGY HLA CROSSMATCH CONSULTATION: DONOR/RECIPIENT  VIRTUAL CROSSMATCH - Kidney  Consultation Date: 2017  Consultation Requested by: Dr. Victor M López  Regarding: Compatibility of  donor organ UNOS #[id number] from OPO/Hospital: [facility]  with Mauricio Galo      Findings: Regarding a virtual crossmatch between Mauricio Galo and  donor listed above (match ID [id number]):  The most recent (10/12/16) and 12 additional patient serum/sera  were analyzed.  The patient has no antibodies listed with HLA specificity against the donor organ.      Record Review Indicates: I personally reviewed the most recent serum, the historic peak sera, and all other sera with solid-phase HLA Single Antigen test results:  The patient has no HLA antibodies against the donor organ.     The results of this virtual XM are:   -most recent serum: compatible   -peak #1: compatible  -peak #2: compatible    NB: This virtual crossmatch is based on test results obtained three mos. and eight days prior to the kidney offer.  The results may not reflect the patient's current sensitization status to HLA antigens.    Disclaimer: Clinical judgement must take into account other factors, such as non-HLA antibodies not detected in the assay. The VXM gives probabilities only.  The probability does not account for the potential for auto-antibodies that may be present in the patient's serum.  These autoantibodies may render the physical crossmatch falsely positive, and would be detected by an autologous crossmatch.  When possible, confirm findings with prospective allogeneic and autologous flow crossmatches before going to transplant as clinically indicated.     Michael Justice, PhD,[facility]  Medical Director, Immunology/Histocompatibility Laboratory  Pager [phone]

## 2017-02-02 NOTE — PROGRESS NOTES
Patient is being evaluated by cardiology as part of work up for kidney transplant.  Lexiscan ordered, abnormal results.  Of note patient is on dialysis.  New orders received.  Date: 2/2/2017    Time of Call: 12:15 PM     Diagnosis:  Abnormal stress test     [ VORB ] Ordering provider: Dr. Rodrigues  Order: Coronary angiogram     Order received by: Ramos YOUNGER RN     Follow-up/additional notes: Orders placed.  Patient will be called to schedule.

## 2017-02-06 NOTE — PROGRESS NOTES
Patient was called and a voice message left with information on results of stress test and on ordered test, coronary angiogram.  Preparation for test and need for  as well as contact number for questions and scheduling left on voice message.  Awaiting call back or results.

## 2017-03-01 NOTE — TELEPHONE ENCOUNTER
Spoke with patient. Reminded patient of need to call Cardiology to schedule coronary angiogram. Patient stated he was wasn't sure if he understood need for this test, but would follow up. Informed patient, test was necessary to remain active on Wait list.  Requested patient return call to Cardiology nurse Ramos Aranda to discuss test and schedule. Patient unable to take information as he was driving. Suggested call back and leave message on voicemail. Patient agreed.   Return call to phone number, unable to leave voicemail.   Message left for patient at dialysis center with name and phone number to call Cardiology to discuss coronary angiogram and schedule

## 2017-03-01 NOTE — TELEPHONE ENCOUNTER
Returned patient's call and gave information on how his potential donor can register online and provided donor team phone number.

## 2017-03-02 NOTE — PROGRESS NOTES
Spoke with patient about scheduled coronary angiogram and need for ride home.  Scheduled for 3/9/17 at 11:00 am  Left Heart Catheterization:  Patient was instructed regarding left heart catheterization, including discussion of the indication, procedure, preparation, intra-procedural steps, and recovery at home. Patient demonstrated understanding of this information and agreed to call with further questions or concerns.

## 2017-03-08 NOTE — PROGRESS NOTES
Reminder message sent to pt via [employer] re: cor angio scheduled for tomorrow. Included instructions and callback number should pt have questions.

## 2017-03-09 PROBLEM — Z98.890 STATUS POST CORONARY ANGIOGRAM: Status: ACTIVE | Noted: 2017-01-01

## 2017-03-09 NOTE — IP AVS SNAPSHOT
MRN:[id number]                      After Visit Summary   3/9/2017    Mauricio Galo    MRN: [id number]           Thank you!     Thank you for choosing [facility] for your care. Our goal is always to provide you with excellent care. Hearing back from our patients is one way we can continue to improve our services. Please take a few minutes to complete the written survey that you may receive in the mail after you visit with us. Thank you!        Patient Information     Date Of Birth          1955        About your hospital stay     You were admitted on:  March 9, 2017 You last received care in the:  Unit 6D Observation [facility]    You were discharged on:  March 9, 2017       Who to Call     For medical emergencies, please call 911.  For non-urgent questions about your medical care, please call your primary care provider or clinic, [phone]          Attending Provider     Provider Specialty    RANDOLPH Veloz MD Cardiology    EssexEdouard MD Cardiology       Primary Care Provider Office Phone # Fax #    Adam Cordova [phone]       [facility] [address] [place] [id number]        After Care Instructions     Discharge Instructions - IF on Metformin (Glucophage or Glucovance) or Metformin containing medications       IF on Metformin (Glucophage or Glucovance) or Metformin containing medications , schedule a Basic Metabolic Panel at [facility] or Primary Clinic in 48 - 72 hours post procedure and PRIOR TO resuming the Metformin or Metformin containing medications.  Hold Metformin (Glucophage or Glucovance) or Metformin containing medications until after the Basic Metabolic Panel on the 2nd or 3rd day following the procedure.  May resume after blood draw is complete.                  Your next 10 appointments already scheduled     Mar 16, 2017 10:00 AM CDT   (Arrive by 9:45 AM)   New Patient Visit with MD DELVIN Pepper  [facility] ([facility])    [address] [place] [id number] [phone]            May 23, 2017  7:45 AM CDT   Lab with  LAB   [facility] ([facility])    [address] [place] [id number] [phone]            May 23, 2017  8:00 AM CDT   US ABDOMEN/PELVIS DUPLEX COMPLETE with UCUS3   [facility] ([facility])    [address] [place] [id number] [phone]           Please bring a list of your medicines (including vitamins, minerals and over-the-counter drugs). Also, tell your doctor about any allergies you may have. Wear comfortable clothes and leave your valuables at home.  Adults: No eating or drinking for 8 hours before the exam. You may take medicine with a small sip of water.  Children: - Children 6+ years: No food or drink for 6 hours before exam. - Children 1-5 years: No food or drink for 4 hours before exam. - Infants, breast-fed: may have breast milk up to 2 hours before exam. - Infants, formula: may have bottle until 4 hours before exam.  Please call the Imaging Department at your exam site with any questions.            May 23, 2017  9:00 AM CDT   (Arrive by 8:45 AM)   Return General Liver with Didier Pugh MD   [facility] ([facility])    [address] [place] [id number] [phone]              Further instructions from your care team       Going Home after Coronary Angioplasty or Stent Placement        Name: Mauricio Galo  Medical Record Number:  [id number]  Today's Date: March 9, 2017        For 24 hours:         Have an adult stay with you for 24 hours.         Relax and take it easy.         Drink plenty of fluids.         You may eat your normal diet, unless your doctor tells you otherwise.         Do NOT make any important or legal decisions.          Do NOT drive or operate machines at home or at work.         Do NOT drink alcohol.      Do NOT smoke.     Medicines:         If you have begun Plavix (clopidogrel), Effient (prasugrel), or Brilinta (ticagrelor), do not stop taking it until you talk to your heart doctor (cardiologist).         If you are on metformin (Glucophage), do not restart it until you have blood tests (within 2 to 3 days after discharge). When your doctor tells you it is safe, you may restart the metformin.         If you have stopped any other medicines, check with your nurse or provider about when to restart them.    Care of groin site:         Remove the Band-Aid after 24 hours. If there is minor oozing, apply another Band-aid and remove it after 12 hours.          Do NOT take a bath, or use a hot tub or pool for at least 3 days. You may shower.          It is normal to have a small bruise or lump at the site.         Do not scrub the site.         Do not use lotion or powder near the puncture site for 3 days.         For the first 2 days: Do not stoop or squat. When you cough, sneeze or move your bowels, hold your hand over the puncture site and press gently.         Do not lift more than 10 pounds for at least 3 to 5 days.         For 2 days, do NOT have sex or do any heavy exercise.     If you start bleeding from the site in your groin:  Lie down flat and press firmly on the site.  Call your physician immediately, or, come to the emergency room.      Call 911 right away if you have bleeding that is heavy or does not stop.     Call your doctor if:         You have a large or growing hard lump around the site.         The site is red, swollen, hot or tender.         Blood or fluid is draining from the site.         You have chills or a fever greater than 101 F (38 C).         Your leg or arm turns bluish, feels numb or cool.         You have hives, a rash or unusual itching.         ADDITIONAL INSTRUCTIONS: ***    [facility]  [facility]:   [phone] (7 days a week)      Cardiology Fellow on call (24 hours per day) at [facility]:   [phone] (ask for Cardiology Fellow on call)      Number where we can reach you:  ____________         Pending Results     Date and Time Order Name Status Description    3/9/2017 1146 EKG 12-lead, tracing only Preliminary             Admission Information     Date & Time Provider Department Dept. Phone    3/9/2017 Edouard Boyce MD Unit 6D Observation [facility] [place] [phone]      Your Vitals Were     Blood Pressure Pulse Temperature Respirations Height Weight    140/86 96 96.1  F (35.6  C) (Axillary) 18 1.753 m (5' 9\") 68 kg (150 lb)    Pulse Oximetry BMI (Body Mass Index)                94% 22.15 kg/m2          MyChart Information     [facility] gives you secure access to your electronic health record. If you see a primary care provider, you can also send messages to your care team and make appointments. If you have questions, please call your primary care clinic.  If you do not have a primary care provider, please call [phone] and they will assist you.        Care EveryWhere ID     This is your Care EveryWhere ID. This could be used by other organizations to access your [facility] medical records  [id number]           Review of your medicines      START taking        Dose / Directions    aspirin 81 MG EC tablet   Used for:  Coronary atherosclerosis due to lipid rich plaque        Dose:  81 mg   Start taking on:  3/10/2017   Take 1 tablet (81 mg) by mouth daily   Quantity:  60 tablet   Refills:  3       pravastatin 20 MG tablet   Commonly known as:  PRAVACHOL   Used for:  Coronary atherosclerosis due to lipid rich plaque        Dose:  20 mg   Take 1 tablet (20 mg) by mouth every evening   Quantity:  30 tablet   Refills:  1         CONTINUE these medicines which have NOT CHANGED        Dose / Directions    abacavir 300 MG tablet   Commonly known as:  ZIAGEN     "    Dose:  300 mg   Take 300 mg by mouth 2 times daily   Refills:  0       CARTIA  MG 24 hr capsule   Generic drug:  diltiazem        Take by mouth daily   Refills:  0       lamiVUDine 10 MG/ML solution   Commonly known as:  EPIVIR        10mg/ml oral solution:  pateitn instructed to take as follows: 2.5ml by mouth every day.   Refills:  0       metoprolol 50 MG 24 hr tablet   Commonly known as:  TOPROL-XL        Dose:  50 mg   Take 50 mg by mouth daily.   Refills:  0       rilpivirine 25 MG tablet   Commonly known as:  EDURANT        Dose:  2.5 mg   Take 2.5 mg by mouth daily   Refills:  0            Where to get your medicines      These medications were sent to [facility] [address] [place] [address] AT [address] [place] [address] & [address] [phone]    Hours:  24-hours Phone:  [phone]     aspirin 81 MG EC tablet    pravastatin 20 MG tablet                Protect others around you: Learn how to safely use, store and throw away your medicines at www.disposemymeds.org.             Medication List: This is a list of all your medications and when to take them. Check marks below indicate your daily home schedule. Keep this list as a reference.      Medications           Morning Afternoon Evening Bedtime As Needed    abacavir 300 MG tablet   Commonly known as:  ZIAGEN   Take 300 mg by mouth 2 times daily                                aspirin 81 MG EC tablet   Take 1 tablet (81 mg) by mouth daily   Start taking on:  3/10/2017                                CARTIA  MG 24 hr capsule   Take by mouth daily   Generic drug:  diltiazem                                lamiVUDine 10 MG/ML solution   Commonly known as:  EPIVIR   10mg/ml oral solution:  pateitn instructed to take as follows: 2.5ml by mouth every day.                                metoprolol 50 MG 24 hr tablet   Commonly known as:  TOPROL-XL   Take 50 mg by mouth daily.                                 pravastatin 20 MG tablet   Commonly known as:  PRAVACHOL   Take 1 tablet (20 mg) by mouth every evening                                rilpivirine 25 MG tablet   Commonly known as:  EDURANT   Take 2.5 mg by mouth daily

## 2017-03-09 NOTE — PROGRESS NOTES
Manual pressure held for 47 minutes to left groin site.  Sheath, size 6,  pulled by ERYN Rodriguez.  Site CDI, no hematoma.  Stasis achieved at 1655. Off bedrest @ 2055.

## 2017-03-09 NOTE — IP AVS SNAPSHOT
Unit 6D Observation [address] [address]    Phone:  [phone]    Fax:  [phone]                                       After Visit Summary   3/9/2017    Mauricio Galo    MRN: [id number]           After Visit Summary Signature Page     I have received my discharge instructions, and my questions have been answered. I have discussed any challenges I see with this plan with the nurse or doctor.    ..........................................................................................................................................  Patient/Patient Representative Signature      ..........................................................................................................................................  Patient Representative Print Name and Relationship to Patient    ..................................................               ................................................  Date                                            Time    ..........................................................................................................................................  Reviewed by Signature/Title    ...................................................              ..............................................  Date                                                            Time

## 2017-03-09 NOTE — PROCEDURES
FINAL CARDIAC CATH REPORT:     PROCEDURES PERFORMED:   Coronary Angiography    PHYSICIANS:  Attending Physician: Edouard Boyce MD  Interventional Cardiology Fellow: None  Cardiology Fellow: Singh Khan    INDICATION:  Mauricio Galo is a 61 year old male with ESRD on HD, with risk factor profile (+) HTN, (-) DM, (+) hypercholesterolemia, (+)  Current 80+ pack-year tobacco use, (-) fam Hx premature CAD, with HIV, HCV, who presents on an elective outpatient basis after abnormal echo (infarct/malathi-infarct ischemic in the basal to mid inf/inferolateral segments) for kidney transplant workup.     DESCRIPTION:  1. Consent obtained with discussion of risks.  All questions were answered.  2. Sterile prep and procedure.  3. Location with Sheaths:   Site: Left femoral artery (due to ileofemoral bypass on the right)  4Fr long sheath; exchanged for a 6Fr long sheath   4. Access: Local anesthetic with lidocaine.  A micropuncture 21 guage needle with ultrasound guidance was used to establish vascular access using a modified Seldinger technique.  5. Diagnostic Catheters:   6 Fr  JL 4, 3DRC  6. Guiding Catheters:  6 Fr  XB LF 3.5 GC  6. Estimated blood loss: < 25 ml     MEDICATIONS:  The procedure was performed under conscious sedation for 65 minutes from 1243 to 1348.  Midazolam 2 mg and Fentanyl 100 mcg were administered.  Heart rate, BP, respiration, oxygen saturation and patient responses were monitored throughout the procedure with the assistance of the RN under my supervision.  200 mcg of IC nitroglycerin was given  >> Antiplatelet Therapy: The patient took a full aspirin prior to entering the procedure room.     Procedures:    CORONARY ANGIOGRAM:   1. Heavily calcified coronary arteries.   2. Both coronary arteries arise from their respective cusps.  3. Dominance: Balanced  4. The LM is heavily calcified and has a 25% stenosis.  5. LAD: Type 3 [LAD supplies the entire apex]. Heavily calcified to the mid LAD. The LAD gives  rise to septal perforators, a large D1 and small D2.  The pLAD has a <25% stenosis, mLAD has a 60% stenosis at the septal  then a 60%, dLAD has <25% disease. D1 has 40% stenosis.  6. LCX is heavily calcified and gives rise to 3 small OM vessels, a large OM4 and an OM5 vessel.  The pLCx has a <25% stenosis, mLCx has a 40-50% stenosis, dLCx has a <25% stenosis, OM4 has an 80% ostial lesion. The LPDA as 25% stenosis. The LPDA has 25% stenosis. The AV groove Cx has 60%.  There is a moderate sized OM branch that is occluded at the ostium and emerges from the LCx just beyond OM4 and fills by left to left collaterals.  7. RCA is heavily calcified. The pRCA is 100% occluded proximally. There are left to right collaterals coming from the distal LCx to the RV marginals.     FUNCTIONAL ASSESSMENT:  Adequate anticoagulation was was obtained with IV UFH.  FFR distal LAD-- A Radi Aeris wire was passed across the lesion.  Maximal hyperemia was induced with IC Adenosine (80 mcg).  The FFR at peak hyperemia was 0.77  FFR distal LPDA-- A Radi Aeris wire was passed across the lesion.  Maximal hyperemia was induced with IC Adenosine (80 mcg).  The FFR at peak hyperemia was 0.81    Sheath Removal:  The 6Fr RCFA sheath will be removed after the patient has been transferred to the unit.    Contrast: Isovue, 180 ml     Fluoroscopy Time: 11.5 min    COMPLICATIONS:  1. None    SUMMARY:   Three vessel CAD RCA, LAD and LCX   Borderline physiologically significant disease in the LAD (FFR 0.77) and Lf PDA (0.81) in Left dominant system    PLAN:   1. CV surgery has been consulted. Discussed with Dr. Merino. Decent targets are distal LAD, OM4, OM5, and LPDA. The large D1 does not need a bypass graft.  2. Bedrest per protocol.  3. Initiate aspirin 81 mg and pravastatin 20 mg. Aggressive lifestyle modification.   4. Discharge today per protocol    The attending interventional cardiologist was present and supervised all critical aspects the  procedure.    Findings discussed with Dr Rodrigues.    See CVIS report for final draft.    Singh Khan  Cardiology Fellow      Staff Cardiologist: I supervised the cardiology fellow and reviewed the hemodynamic findings with the fellow at the completion of the procedure.  I reviewed the coronary angiogram in detail with the cardiology fellow.  I then discussed my impression with Dr. Vaughn Merino and referred the patient to him for CABG.   I agree with the documentation above.    Edouard Boyce MD

## 2017-03-10 NOTE — DISCHARGE INSTRUCTIONS
Going Home after Coronary Angioplasty or Stent Placement        Name: Mauricio Galo  Medical Record Number:  [id number]  Today's Date: March 9, 2017        For 24 hours:         Have an adult stay with you for 24 hours.         Relax and take it easy.         Drink plenty of fluids.         You may eat your normal diet, unless your doctor tells you otherwise.         Do NOT make any important or legal decisions.         Do NOT drive or operate machines at home or at work.         Do NOT drink alcohol.      Do NOT smoke.     Medicines:         If you have begun Plavix (clopidogrel), Effient (prasugrel), or Brilinta (ticagrelor), do not stop taking it until you talk to your heart doctor (cardiologist).         If you are on metformin (Glucophage), do not restart it until you have blood tests (within 2 to 3 days after discharge). When your doctor tells you it is safe, you may restart the metformin.         If you have stopped any other medicines, check with your nurse or provider about when to restart them.    Care of groin site:         Remove the Band-Aid after 24 hours. If there is minor oozing, apply another Band-aid and remove it after 12 hours.          Do NOT take a bath, or use a hot tub or pool for at least 3 days. You may shower.          It is normal to have a small bruise or lump at the site.         Do not scrub the site.         Do not use lotion or powder near the puncture site for 3 days.         For the first 2 days: Do not stoop or squat. When you cough, sneeze or move your bowels, hold your hand over the puncture site and press gently.         Do not lift more than 10 pounds for at least 3 to 5 days.         For 2 days, do NOT have sex or do any heavy exercise.     If you start bleeding from the site in your groin:  Lie down flat and press firmly on the site.  Call your physician immediately, or, come to the emergency room.      Call 911 right away if you have bleeding that is heavy or does not  stop.     Call your doctor if:         You have a large or growing hard lump around the site.         The site is red, swollen, hot or tender.         Blood or fluid is draining from the site.         You have chills or a fever greater than 101 F (38 C).         Your leg or arm turns bluish, feels numb or cool.         You have hives, a rash or unusual itching.         ADDITIONAL INSTRUCTIONS: ***    [facility] [place]:   [phone] (7 days a week)      Cardiology Fellow on call (24 hours per day) at [facility]:   [phone] (ask for Cardiology Fellow on call)      Number where we can reach you:  ____________

## 2017-03-10 NOTE — PROGRESS NOTES
Called to bedside by RN at around 7:55pm stating patient refusing to stay for full duration of his bedrest post-coronary angio.   Discussed reasoning as well as risks of going off bedrest early after procedure, risks including but not limited to severe life-threatening bleeding and death.   Despite extensive discussion and attempts at reasoning with the patient as he only has 1 hour remaining of bedrest, patient remained extremely agitated, yelling at provider, and stated he understood risks but refused to stay because of his dogs at home and his allegedly having been promised that he would only be in the hospital 4 hours total today.    Patient signed AMA paperwork prior to leaving.

## 2017-03-13 NOTE — TELEPHONE ENCOUNTER
1.  Date/reason for appt:  3/16/17   CABG    2.  Referring provider:  Dr Boyce    3.  Call to patient (Yes / No - short description):  No, referred.  Records reviewed.  All records are in [facility] and imaging is in PACS.

## 2017-03-15 NOTE — TELEPHONE ENCOUNTER
ASKED BY REFERRING PHYSICIAN:   MARILOU Veloz MD      CHIEF COMPLAINT: Pre Visit Planning - Done (Consultation for 3 vessel coronary artery bypass)      HPI: Mauricio is a 62 year old male who presents with presented for cardiac stratification maintain renal Tx waitlist status.  Patient,s Lexiscan echocardiogram was abnormal.  Patient then had coronary angiogram and was found to have severe 3 vessel coronary artery disease.  Patient's history includes, CKD stage 5 of unclear etiology (on dialysis), no DM, heavy smoker (80+ pack-year tobacco use), known PAD, known CVA and HIV positive.  Patient currently denies symptoms.    PAST MEDICAL HISTORY:  Past Medical History   Diagnosis Date     Anemia 4/30/2012     Anxiety      CKD (chronic kidney disease) stage 3, GFR 30-59 ml/min 01/25/2012     COPD (chronic obstructive pulmonary disease) (H)      CVA (cerebral infarction) 4/30/2012     complete occlusion of the proximal left internal carotid artery with distal reconstitution via the left opthalmic artery     End stage kidney disease (H)      HIV (human immunodeficiency virus infection) (H) dx 4/16/1997     started meds 6/24/1997     HTN (hypertension)      Hx SBO 8/01/2012     recurrent     PVD (peripheral vascular disease) (H) 8/22/2006     Thrombocytosis (H) 5/01/2012     Wound dehiscence 5/01/2012       PAST SURGICAL HISTORY:  Past Surgical History   Procedure Laterality Date     Bypass graft iliac to femoral  4/5/2012     Right common iliac artery endarterectomy  4/2012     Exploratory laparotomy, small bowel resection  8/2012       FAMILY HISTORY:   No family history on file.    SOCIAL HISTORY:  Social History     Social History     Marital status: Single     Spouse name: N/A     Number of children: N/A     Years of education: N/A     Occupational History     Not on file.     Social History Main Topics     Smoking status: Current Some Day Smoker     Packs/day: 1.00     Years: 0.00     Types: Cigarettes      Smokeless tobacco: Never Used      Comment: stopped 2 weeks ago     Alcohol use No     Drug use: No     Sexual activity: Not on file     Other Topics Concern     Not on file     Social History Narrative        ALLERGIES:   Allergies   Allergen Reactions     No Known Drug Allergy        CURRENT MEDICATIONS:   [unfilled]    ROS:  Constitutional: No fever, chills, or sweats. No weight gain/loss.   HEENT: No visual disturbance, ear ache, epistaxis, sore throat.   Allergies/Immunologic: Negative.   Respiratory: No cough, hemoptysis.   Cardiovascular: As per HPI.   GI: No nausea, vomiting, hematemesis, melena, or hematochezia.   : No urinary frequency, dysuria, or hematuria.   Integument: No rash.   Psychiatric: No anxiety / depression.   Neuro: No speech disturbance, focal sensory or motor deficit.   Endocrinology: No polyuria / polyphagia.   Musculoskeletal: No myalgia.      PHYSICAL EXAMINATION:   There were no vitals taken for this visit.  General: alert and oriented x 3, pleasant, no acute distress  CV: S1 S2, no murmurs, rubs or gallops, regular rate and rhythm, no peripheral edema, no carotid or abdomenal bruits, pulses in upper and lower extremities palpable  Pulm: bilateral breath sounds, clear to auscultation, easy work of breathing  GI: (+) bowel sounds, soft non-tender and non-distended  : voiding without problems  MS: moves all extremities x 4,  5+/5+ equal strength bilaterally  Neuro: pupils equal round and reactive to light, cranial nerves, II-XII grossly intact, no gross neurologic deficits noted    LABS:  BMP RESULTS:  Lab Results   Component Value Date     03/09/2017    POTASSIUM 4.8 03/09/2017    CHLORIDE 98 03/09/2017    CO2 31 03/09/2017    ANIONGAP 11 03/09/2017    GLC 77 03/09/2017    BUN 34 (H) 03/09/2017    CR 5.00 (H) 03/09/2017    GFRESTIMATED 12 (L) 03/09/2017    GFRESTBLACK 14 (L) 03/09/2017    WINTER 8.8 03/09/2017        CBC RESULTS:  Lab Results   Component Value Date    WBC 5.1  03/09/2017    RBC 3.54 (L) 03/09/2017    HGB 12.0 (L) 03/09/2017    HCT 36.6 (L) 03/09/2017     (H) 03/09/2017    MCH 33.9 (H) 03/09/2017    MCHC 32.8 03/09/2017    RDW 16.6 (H) 03/09/2017     03/09/2017       Lab Results   Component Value Date    INR 1.21 (H) 03/09/2017     Lab Results   Component Value Date    PTT 31 03/09/2017     Lab Results   Component Value Date    UA A:24 B:37 44 49 76 DR:8 DQ:5 07/14/2016     Lab Results   Component Value Date    A1C 5.2 03/17/2014       PROCEDURES/IMAGING:    LEXISCAN: 01/31/17  Findings:  1. Overall quality of the study: Diagnostic.   2. Left ventricular cavity is mildly dilated on the rest and stress studies.  3. SPECT images demonstrate medium to large sized, moderate intensity, partially reversible perfusion abnormality in the basal to mid inferior and inferolateral myocardial segments. These results suggest a high post-scan likelihood significant coronary artery disease. The summed stress score is 8.    4. Gated SPECT images reveal normal left ventricular systolic function with a basal to mid inferior and inferolateral hypokinetic myocardial segment.Left ventricular ejection fraction is 59%. Left ventricular end-diastolic volume is 158 mL. End-systolic volume is 65 mL.       Impression:  1. Abnormal myocardial SPECT study with a summed stress score of 8. A summed stress score of 8 is associated with an annual event rate of 2.5% and 1% for myocardial infarction and cardiac death, respectively (Ajit. Circulation 1998;98:535-43).   2. Perfusion abnormality consistent with prior infarct in the basal to mid inferior and inferolateral wall with moderate malathi-infarct ischemia (most likely left circumflex coronary artery territory).  3. LVEF calculated at 59%.  4. No prior study available for comparison..      CORONARY ANGIOGRAM: 03/14/17  Mauricio Galo is a 61 year old male with ESRD on HD, with risk factor profile (+) HTN, (-) DM, (+) HLP, (+)   Current 80+ pack-year tobacco use, (-) fam Hx premature CAD, with HIV, HCV, here on an elective outpatient basis after abnormal stress echo (infarct/malathi-infarct ischemic in the basal to mid inf/inferolateral segments) for kidney transplant workup.     See coronary section for angiogram results  See hemodynamics for FFR    Access: LCFV (has ileofemoral bypass on right)    SUMMARY:   Three vessel CAD RCA, LAD and LCX  FFR of distal LAD 0.77  FFR of distal LPDA 0.81     PLAN:   1. CV surgery has been consulted. Discussed with Dr. Merino. Decent targets are distal LAD, OM4, OM5, and LPDA. The large D1 does not need a bypass graft.  2. Bedrest per protocol.  3. Initiate aspirin 81 mg and pravastatin 20 mg. Aggressive lifestyle modification.   4. Discharge today per protocol      ASSESSMENT/PLAN:   Mauricio is a 62 year old male who presents with       Risks and benefits of surgery were discussed with patient (and all present) including risk of death, stroke, bleeding, cardiac ischemia, wound infection, renal failure, arrhythmias and possible pacemaker implantation. Patient accepts these risks and is willing to proceed with surgery.   Approximately 50 minutes spent with this case including review of the clinical history and data; discussion with the patient and his family and coordination of the care    Thank you for including me in the care of this kind patient. Do not hesitate to contact me with any questions.    Dr. Arvin Merino     Cardiothoracic Surgery  [phone] pager  [phone] office    CC  Patient Care Team:  Adam Cordova MD as PCP - General (Infectious Diseases)  Didier Pugh MD as MD (Internal Medicine)

## 2017-03-16 NOTE — PATIENT INSTRUCTIONS
You were seen today in the [facility]                     Cardiothoracic Surgery Clinic    Your surgeon was:  Dr Arvin Merino     Recommendations:  Dr Arvin Merino recommends you return to Dr Boyce for discussion for stenting as you are a very high risk surgical candidate.    Dr Arvin Merino would also like you to have an echocardiogram when you see Dr Boyce to assess the cause of your heart murmur.      Please feel free to call me with any questions or concerns.    Ramila Castro, RN  Care Coordinator, Cardiothoracic Surgery   [phone]

## 2017-03-16 NOTE — NURSING NOTE
Chief Complaint   Patient presents with     New Patient     Consultation for 3 vessel coronary artery bypass

## 2017-03-16 NOTE — LETTER
3/16/2017      RE: Mauricio Galo  [address]   [facility] [address]       Dear Colleague,    Thank you for the opportunity to participate in the care of your patient, Mauricio Galo, at the [facility]. Please see a copy of my visit note below.    ASKED BY REFERRING PHYSICIAN: MARILOU Veloz MD to see patient in consultation regarding surgical treatment of CAD     CHIEF COMPLAINT:  Consultation for 3 vessel coronary artery bypass        HPI:     Mauricio is a 62 year old male who presents with presented for cardiac stratification maintain renal Tx waitlist status. Patient,s Lexiscan echocardiogram was abnormal. Patient then had coronary angiogram and was found to have severe 3 vessel coronary artery disease. Patient's history includes, CKD stage 5 of unclear etiology (on dialysis), no DM, heavy smoker (80+ pack-year tobacco use), known PAD, known CVA and HIV positive.     Patient currently denies symptoms.       PAST MEDICAL HISTORY:  Past Medical History   Diagnosis Date     Anemia 4/30/2012     Anxiety      CKD (chronic kidney disease) stage 3, GFR 30-59 ml/min 01/25/2012     COPD (chronic obstructive pulmonary disease) (H)      CVA (cerebral infarction) 4/30/2012     complete occlusion of the proximal left internal carotid artery with distal reconstitution via the left opthalmic artery     End stage kidney disease (H)      HIV (human immunodeficiency virus infection) (H) dx 4/16/1997     started meds 6/24/1997     HTN (hypertension)      Hx SBO 8/01/2012     recurrent     PVD (peripheral vascular disease) (H) 8/22/2006     Thrombocytosis (H) 5/01/2012     Wound dehiscence 5/01/2012       PAST SURGICAL HISTORY:  Past Surgical History   Procedure Laterality Date     Bypass graft iliac to femoral  4/5/2012     Right common iliac artery endarterectomy  4/2012     Exploratory laparotomy, small bowel resection  8/2012       FAMILY  HISTORY:   No family history on file.    SOCIAL HISTORY:  Social History     Social History     Marital status: Single     Spouse name: N/A     Number of children: N/A     Years of education: N/A     Occupational History     Not on file.     Social History Main Topics     Smoking status: Current Some Day Smoker     Packs/day: 1.00     Years: 0.00     Types: Cigarettes     Smokeless tobacco: Never Used      Comment: stopped 2 weeks ago     Alcohol use No     Drug use: No     Sexual activity: Not on file     Other Topics Concern     Not on file     Social History Narrative        ALLERGIES:   Allergies   Allergen Reactions     No Known Drug Allergy        CURRENT MEDICATIONS:   Prescription Medications as of 3/17/2017             aspirin EC 81 MG EC tablet Take 1 tablet (81 mg) by mouth daily    pravastatin (PRAVACHOL) 20 MG tablet Take 1 tablet (20 mg) by mouth every evening    lamiVUDine (EPIVIR) 10 MG/ML solution 10mg/ml oral solution:  noah instructed to take as follows: 2.5ml by mouth every day.    diltiazem (CARTIA XT) 180 MG 24 hr CD capsule Take by mouth daily     rilpivirine (EDURANT) 25 MG tablet Take 2.5 mg by mouth daily     abacavir (ZIAGEN) 300 MG tablet Take 300 mg by mouth 2 times daily    metoprolol (TOPROL-XL) 50 MG 24 hr tablet Take 50 mg by mouth daily.          ROS:  Constitutional: No fever, chills, or sweats. No weight gain/loss.   HEENT: No visual disturbance, ear ache, epistaxis, sore throat.   Allergies/Immunologic: Negative.   Respiratory: No cough, hemoptysis.   Cardiovascular: As per HPI.   GI: No nausea, vomiting, hematemesis, melena, or hematochezia.   : No urinary frequency, dysuria, or hematuria.   Integument: No rash.   Psychiatric: No anxiety / depression.   Neuro: No speech disturbance, focal sensory or motor deficit.   Endocrinology: No polyuria / polyphagia.   Musculoskeletal: No myalgia.      PHYSICAL EXAMINATION:   /87 (BP Location: Left arm, Patient Position: Chair,  "Cuff Size: Adult Regular)  Pulse 93  Ht 1.753 m (5' 9\")  Wt 71.4 kg (157 lb 8 oz)  SpO2 95%  BMI 23.26 kg/m2  General: alert and oriented x 3, pleasant, no acute distress  CV: S1 S2, no murmurs, rubs or gallops, regular rate and rhythm, no peripheral edema, no carotid or abdomenal bruits, pulses in upper and lower extremities palpable  Pulm: bilateral breath sounds, clear to auscultation, easy work of breathing  GI: (+) bowel sounds, soft non-tender and non-distended  : voiding without problems  MS: moves all extremities x 4,  5+/5+ equal strength bilaterally  Neuro: pupils equal round and reactive to light, cranial nerves, II-XII grossly intact, no gross neurologic deficits noted    LABS:  BMP RESULTS:  Lab Results   Component Value Date     03/09/2017    POTASSIUM 4.8 03/09/2017    CHLORIDE 98 03/09/2017    CO2 31 03/09/2017    ANIONGAP 11 03/09/2017    GLC 77 03/09/2017    BUN 34 (H) 03/09/2017    CR 5.00 (H) 03/09/2017    GFRESTIMATED 12 (L) 03/09/2017    GFRESTBLACK 14 (L) 03/09/2017    WINTER 8.8 03/09/2017        CBC RESULTS:  Lab Results   Component Value Date    WBC 5.1 03/09/2017    RBC 3.54 (L) 03/09/2017    HGB 12.0 (L) 03/09/2017    HCT 36.6 (L) 03/09/2017     (H) 03/09/2017    MCH 33.9 (H) 03/09/2017    MCHC 32.8 03/09/2017    RDW 16.6 (H) 03/09/2017     03/09/2017       Lab Results   Component Value Date    INR 1.21 (H) 03/09/2017     Lab Results   Component Value Date    PTT 31 03/09/2017     Lab Results   Component Value Date    UA A:24 B:37 44 49 76 DR:8 DQ:5 07/14/2016     Lab Results   Component Value Date    A1C 5.2 03/17/2014       PROCEDURES/IMAGING:  LEXISCAN: 01/31/17  Findings:  1. Overall quality of the study: Diagnostic.   2. Left ventricular cavity is mildly dilated on the rest and stress studies.  3. SPECT images demonstrate medium to large sized, moderate intensity, partially reversible perfusion abnormality in the basal to mid inferior and inferolateral myocardial " segments. These results suggest a high post-scan likelihood significant coronary artery disease. The summed stress score is 8.   4. Gated SPECT images reveal normal left ventricular systolic function with a basal to mid inferior and inferolateral hypokinetic myocardial segment.Left ventricular ejection fraction is 59%. Left ventricular end-diastolic volume is 158 mL. End-systolic volume is 65 mL.       Impression:  1. Abnormal myocardial SPECT study with a summed stress score of 8. A summed stress score of 8 is associated with an annual event rate of 2.5% and 1% for myocardial infarction and cardiac death, respectively (Ajit. Circulation 1998;98:535-43).   2. Perfusion abnormality consistent with prior infarct in the basal to mid inferior and inferolateral wall with moderate malathi-infarct ischemia (most likely left circumflex coronary artery territory).  3. LVEF calculated at 59%.  4. No prior study available for comparison..        CORONARY ANGIOGRAM: 03/14/17  Mauricio Galo is a 61 year old male with ESRD on HD, with risk factor profile (+) HTN, (-) DM, (+) HLP, (+) Current 80+ pack-year tobacco use, (-) fam Hx premature CAD, with HIV, HCV, here on an elective outpatient basis after abnormal stress echo (infarct/malathi-infarct ischemic in the basal to mid inf/inferolateral segments) for kidney transplant workup.      See coronary section for angiogram results  See hemodynamics for FFR     Access: LCFV (has ileofemoral bypass on right)     SUMMARY:   Three vessel CAD RCA, LAD and LCX  FFR of distal LAD 0.77  FFR of distal LPDA 0.81      PLAN:   1. CV surgery has been consulted. Discussed with Dr. Merino. Decent targets are distal LAD, OM4, OM5, and LPDA. The large D1 does not need a bypass graft.  2. Bedrest per protocol.  3. Initiate aspirin 81 mg and pravastatin 20 mg. Aggressive lifestyle modification.   4. Discharge today per protocol        ASSESSMENT/PLAN:     Mauricio is a 62 year old male who presents  for cardiac stratification maintain renal Tx waitlist status. Patient,s Lexiscan echocardiogram was abnormal. Patient then had coronary angiogram and was found to have severe 3 vessel coronary artery disease.      Recommend CABG but he would be at increased risk due to CKD stage 5 of unclear etiology (on dialysis), no DM, heavy smoker (80+ pack-year tobacco use), known PAD, known CVA and HIV positive.  Patient is concerned about surgery with his ongoing medical conditions and would like to discuss with his cardiologist about stenting option.    Meanwhile ask patient to quit smoking. Wait for his visit and discussion with his cardiologist.     Approximately 50 minutes spent with this case including review of the clinical history and data; discussion with the patient and his family and coordination of the care     Thank you for including me in the care of this kind patient. Do not hesitate to contact me with any questions.     Dr. Arvin Merino   Cardiothoracic Surgery  [phone] pager  [phone] office    CC  Patient Care Team:  Adam Cordova MD as PCP - General (Infectious Diseases)  Didier Pugh MD as MD (Internal Medicine)  ADAM CORDOVA

## 2017-03-16 NOTE — MR AVS SNAPSHOT
After Visit Summary   3/16/2017    Mauricio Galo    MRN: [id number]           Patient Information     Date Of Birth          1955        Visit Information        Provider Department      3/16/2017 10:00 AM Arvin Merino MD [facility]        Care Instructions    You were seen today in the [facility]                     Cardiothoracic Surgery Clinic    Your surgeon was:  guerrero    Recommendations:  Dr Arvin Merino recommends you return to Dr Boyce for discussion for stenting as you are a very high risk surgical candidate.    Dr Arvin Merino would also like you to have an echocardiogram when you see Dr Boyce to assess the cause of your heart murmur.      Please feel free to call me with any questions or concerns.    Ramila Castro, RN  Care Coordinator, Cardiothoracic Surgery   [phone]              Follow-ups after your visit        Follow-up notes from your care team     Return Next availalble.      Your next 10 appointments already scheduled     Apr 11, 2017  8:30 AM CDT   (Arrive by 8:15 AM)   New Patient Visit with Edouard Boyce MD   [facility] ([facility])    [address] [place] [id number] [phone]            May 23, 2017  7:45 AM CDT   Lab with  LAB   [facility] ([facility])    [address] [place] [id number] [phone]            May 23, 2017  8:00 AM CDT   US ABDOMEN/PELVIS DUPLEX COMPLETE with [address] [place] [address] Imaging Center [facility] ([facility])    [address] [place] [address] [id number] [phone]           Please bring a list of your medicines (including vitamins, minerals and over-the-counter drugs). Also, tell your doctor about any allergies you may have. Wear comfortable clothes and leave your valuables at home.  Adults: No eating or drinking for 8 hours before the  exam. You may take medicine with a small sip of water.  Children: - Children 6+ years: No food or drink for 6 hours before exam. - Children 1-5 years: No food or drink for 4 hours before exam. - Infants, breast-fed: may have breast milk up to 2 hours before exam. - Infants, formula: may have bottle until 4 hours before exam.  Please call the Imaging Department at your exam site with any questions.            May 23, 2017  9:00 AM CDT   (Arrive by 8:45 AM)   Return General Liver with Didier Pugh MD   [facility] ([facility])    [address]  3rd Floor  [facility] [address] [phone]              Who to contact     If you have questions or need follow up information about today's clinic visit or your schedule please contact [facility] directly at [phone].  Normal or non-critical lab and imaging results will be communicated to you by MyChart, letter or phone within 4 business days after the clinic has received the results. If you do not hear from us within 7 days, please contact the clinic through [employer] or phone. If you have a critical or abnormal lab result, we will notify you by phone as soon as possible.  Submit refill requests through [employer] or call your pharmacy and they will forward the refill request to us. Please allow 3 business days for your refill to be completed.          Additional Information About Your Visit        [employer] Information     [employer] gives you secure access to your electronic health record. If you see a primary care provider, you can also send messages to your care team and make appointments. If you have questions, please call your primary care clinic.  If you do not have a primary care provider, please call [phone] and they will assist you.        Care EveryWhere ID     This is your Care EveryWhere ID. This could be used by other organizations to access your [facility] medical records  [id number]        Your Vitals  "Were     Pulse Height Pulse Oximetry BMI (Body Mass Index)          93 1.753 m (5' 9\") 95% 23.26 kg/m2         Blood Pressure from Last 3 Encounters:   03/16/17 153/87   03/09/17 140/86   01/03/17 123/74    Weight from Last 3 Encounters:   03/16/17 71.4 kg (157 lb 8 oz)   03/09/17 68 kg (150 lb)   01/03/17 72.6 kg (160 lb 1.6 oz)              Today, you had the following     No orders found for display       Primary Care Provider Office Phone # Fax #    Adam Cordova [phone]       [facility] [address] [address]        Thank you!     Thank you for choosing [facility]  for your care. Our goal is always to provide you with excellent care. Hearing back from our patients is one way we can continue to improve our services. Please take a few minutes to complete the written survey that you may receive in the mail after your visit with us. Thank you!             Your Updated Medication List - Protect others around you: Learn how to safely use, store and throw away your medicines at www.disposemymeds.org.          This list is accurate as of: 3/16/17 10:22 AM.  Always use your most recent med list.                   Brand Name Dispense Instructions for use    abacavir 300 MG tablet    ZIAGEN     Take 300 mg by mouth 2 times daily       aspirin 81 MG EC tablet     60 tablet    Take 1 tablet (81 mg) by mouth daily       CARTIA  MG 24 hr capsule   Generic drug:  diltiazem      Take by mouth daily       lamiVUDine 10 MG/ML solution    EPIVIR     10mg/ml oral solution:  pateitn instructed to take as follows: 2.5ml by mouth every day.       metoprolol 50 MG 24 hr tablet    TOPROL-XL     Take 50 mg by mouth daily.       pravastatin 20 MG tablet    PRAVACHOL    30 tablet    Take 1 tablet (20 mg) by mouth every evening       rilpivirine 25 MG tablet    EDURANT     Take 2.5 mg by mouth daily         "

## 2017-03-16 NOTE — LETTER
3/16/2017      RE: Mauricio Galo  [address]   [facility] [address]       Dear Colleague,    Thank you for the opportunity to participate in the care of your patient, Mauricio Galo, at the [facility]. Please see a copy of my visit note below.    ASKED BY REFERRING PHYSICIAN: MARILOU Veloz MD to see patient in consultation regarding surgical treatment of CAD     CHIEF COMPLAINT:  Consultation for 3 vessel coronary artery bypass        HPI:     Mauricio is a 62 year old male who presents with presented for cardiac stratification maintain renal Tx waitlist status. Patient,s Lexiscan echocardiogram was abnormal. Patient then had coronary angiogram and was found to have severe 3 vessel coronary artery disease. Patient's history includes, CKD stage 5 of unclear etiology (on dialysis), no DM, heavy smoker (80+ pack-year tobacco use), known PAD, known CVA and HIV positive.     Patient currently denies symptoms.       PAST MEDICAL HISTORY:  Past Medical History   Diagnosis Date     Anemia 4/30/2012     Anxiety      CKD (chronic kidney disease) stage 3, GFR 30-59 ml/min 01/25/2012     COPD (chronic obstructive pulmonary disease) (H)      CVA (cerebral infarction) 4/30/2012     complete occlusion of the proximal left internal carotid artery with distal reconstitution via the left opthalmic artery     End stage kidney disease (H)      HIV (human immunodeficiency virus infection) (H) dx 4/16/1997     started meds 6/24/1997     HTN (hypertension)      Hx SBO 8/01/2012     recurrent     PVD (peripheral vascular disease) (H) 8/22/2006     Thrombocytosis (H) 5/01/2012     Wound dehiscence 5/01/2012       PAST SURGICAL HISTORY:  Past Surgical History   Procedure Laterality Date     Bypass graft iliac to femoral  4/5/2012     Right common iliac artery endarterectomy  4/2012     Exploratory laparotomy, small bowel resection  8/2012       FAMILY  HISTORY:   No family history on file.    SOCIAL HISTORY:  Social History     Social History     Marital status: Single     Spouse name: N/A     Number of children: N/A     Years of education: N/A     Occupational History     Not on file.     Social History Main Topics     Smoking status: Current Some Day Smoker     Packs/day: 1.00     Years: 0.00     Types: Cigarettes     Smokeless tobacco: Never Used      Comment: stopped 2 weeks ago     Alcohol use No     Drug use: No     Sexual activity: Not on file     Other Topics Concern     Not on file     Social History Narrative        ALLERGIES:   Allergies   Allergen Reactions     No Known Drug Allergy        CURRENT MEDICATIONS:   Prescription Medications as of 3/17/2017             aspirin EC 81 MG EC tablet Take 1 tablet (81 mg) by mouth daily    pravastatin (PRAVACHOL) 20 MG tablet Take 1 tablet (20 mg) by mouth every evening    lamiVUDine (EPIVIR) 10 MG/ML solution 10mg/ml oral solution:  noah instructed to take as follows: 2.5ml by mouth every day.    diltiazem (CARTIA XT) 180 MG 24 hr CD capsule Take by mouth daily     rilpivirine (EDURANT) 25 MG tablet Take 2.5 mg by mouth daily     abacavir (ZIAGEN) 300 MG tablet Take 300 mg by mouth 2 times daily    metoprolol (TOPROL-XL) 50 MG 24 hr tablet Take 50 mg by mouth daily.          ROS:  Constitutional: No fever, chills, or sweats. No weight gain/loss.   HEENT: No visual disturbance, ear ache, epistaxis, sore throat.   Allergies/Immunologic: Negative.   Respiratory: No cough, hemoptysis.   Cardiovascular: As per HPI.   GI: No nausea, vomiting, hematemesis, melena, or hematochezia.   : No urinary frequency, dysuria, or hematuria.   Integument: No rash.   Psychiatric: No anxiety / depression.   Neuro: No speech disturbance, focal sensory or motor deficit.   Endocrinology: No polyuria / polyphagia.   Musculoskeletal: No myalgia.      PHYSICAL EXAMINATION:   /87 (BP Location: Left arm, Patient Position: Chair,  "Cuff Size: Adult Regular)  Pulse 93  Ht 1.753 m (5' 9\")  Wt 71.4 kg (157 lb 8 oz)  SpO2 95%  BMI 23.26 kg/m2  General: alert and oriented x 3, pleasant, no acute distress  CV: S1 S2, no murmurs, rubs or gallops, regular rate and rhythm, no peripheral edema, no carotid or abdomenal bruits, pulses in upper and lower extremities palpable  Pulm: bilateral breath sounds, clear to auscultation, easy work of breathing  GI: (+) bowel sounds, soft non-tender and non-distended  : voiding without problems  MS: moves all extremities x 4,  5+/5+ equal strength bilaterally  Neuro: pupils equal round and reactive to light, cranial nerves, II-XII grossly intact, no gross neurologic deficits noted    LABS:  BMP RESULTS:  Lab Results   Component Value Date     03/09/2017    POTASSIUM 4.8 03/09/2017    CHLORIDE 98 03/09/2017    CO2 31 03/09/2017    ANIONGAP 11 03/09/2017    GLC 77 03/09/2017    BUN 34 (H) 03/09/2017    CR 5.00 (H) 03/09/2017    GFRESTIMATED 12 (L) 03/09/2017    GFRESTBLACK 14 (L) 03/09/2017    WINTER 8.8 03/09/2017        CBC RESULTS:  Lab Results   Component Value Date    WBC 5.1 03/09/2017    RBC 3.54 (L) 03/09/2017    HGB 12.0 (L) 03/09/2017    HCT 36.6 (L) 03/09/2017     (H) 03/09/2017    MCH 33.9 (H) 03/09/2017    MCHC 32.8 03/09/2017    RDW 16.6 (H) 03/09/2017     03/09/2017       Lab Results   Component Value Date    INR 1.21 (H) 03/09/2017     Lab Results   Component Value Date    PTT 31 03/09/2017     Lab Results   Component Value Date    UA A:24 B:37 44 49 76 DR:8 DQ:5 07/14/2016     Lab Results   Component Value Date    A1C 5.2 03/17/2014       PROCEDURES/IMAGING:  LEXISCAN: 01/31/17  Findings:  1. Overall quality of the study: Diagnostic.   2. Left ventricular cavity is mildly dilated on the rest and stress studies.  3. SPECT images demonstrate medium to large sized, moderate intensity, partially reversible perfusion abnormality in the basal to mid inferior and inferolateral myocardial " segments. These results suggest a high post-scan likelihood significant coronary artery disease. The summed stress score is 8.   4. Gated SPECT images reveal normal left ventricular systolic function with a basal to mid inferior and inferolateral hypokinetic myocardial segment.Left ventricular ejection fraction is 59%. Left ventricular end-diastolic volume is 158 mL. End-systolic volume is 65 mL.       Impression:  1. Abnormal myocardial SPECT study with a summed stress score of 8. A summed stress score of 8 is associated with an annual event rate of 2.5% and 1% for myocardial infarction and cardiac death, respectively (Ajit. Circulation 1998;98:535-43).   2. Perfusion abnormality consistent with prior infarct in the basal to mid inferior and inferolateral wall with moderate malathi-infarct ischemia (most likely left circumflex coronary artery territory).  3. LVEF calculated at 59%.  4. No prior study available for comparison..        CORONARY ANGIOGRAM: 03/14/17  Mauricio Galo is a 61 year old male with ESRD on HD, with risk factor profile (+) HTN, (-) DM, (+) HLP, (+) Current 80+ pack-year tobacco use, (-) fam Hx premature CAD, with HIV, HCV, here on an elective outpatient basis after abnormal stress echo (infarct/malathi-infarct ischemic in the basal to mid inf/inferolateral segments) for kidney transplant workup.      See coronary section for angiogram results  See hemodynamics for FFR     Access: LCFV (has ileofemoral bypass on right)     SUMMARY:   Three vessel CAD RCA, LAD and LCX  FFR of distal LAD 0.77  FFR of distal LPDA 0.81      PLAN:   1. CV surgery has been consulted. Discussed with Dr. Merino. Decent targets are distal LAD, OM4, OM5, and LPDA. The large D1 does not need a bypass graft.  2. Bedrest per protocol.  3. Initiate aspirin 81 mg and pravastatin 20 mg. Aggressive lifestyle modification.   4. Discharge today per protocol        ASSESSMENT/PLAN:     Mauricio is a 62 year old male who presents  for cardiac stratification maintain renal Tx waitlist status. Patient,s Lexiscan echocardiogram was abnormal. Patient then had coronary angiogram and was found to have severe 3 vessel coronary artery disease.      Recommend CABG but he would be at increased risk due to CKD stage 5 of unclear etiology (on dialysis), no DM, heavy smoker (80+ pack-year tobacco use), known PAD, known CVA and HIV positive.  Patient is concerned about surgery with his ongoing medical conditions and would like to discuss with his cardiologist about stenting option.    Meanwhile ask patient to quit smoking. Wait for his visit and discussion with his cardiologist.     Approximately 50 minutes spent with this case including review of the clinical history and data; discussion with the patient and his family and coordination of the care     Thank you for including me in the care of this kind patient. Do not hesitate to contact me with any questions.     Dr. Arvin Merino   Cardiothoracic Surgery  [phone] pager  [phone] office    CC  Patient Care Team:  Adam Cordova MD as PCP - General (Infectious Diseases)  Didier Pugh MD as MD (Internal Medicine)  ADAM CORDOVA    Please do not hesitate to contact me if you have any questions/concerns.     Sincerely,     Arvin Merino MD

## 2017-03-17 NOTE — NURSING NOTE
Patient seen in consultation today for 3 coronary artery bypass.    Patient is not willing to go forward with open heart surgery and patient presents with multiple risk factors.    Patient was scheduled to return to see Dr Boyce in clinic for assessment for stenting.

## 2017-03-17 NOTE — PROGRESS NOTES
ASKED BY REFERRING PHYSICIAN: MARILOU Veloz MD to see patient in consultation regarding surgical treatment of CAD     CHIEF COMPLAINT:  Consultation for 3 vessel coronary artery bypass        HPI:     Mauricio is a 62 year old male who presents with presented for cardiac stratification maintain renal Tx waitlist status. Patient,s Lexiscan echocardiogram was abnormal. Patient then had coronary angiogram and was found to have severe 3 vessel coronary artery disease. Patient's history includes, CKD stage 5 of unclear etiology (on dialysis), no DM, heavy smoker (80+ pack-year tobacco use), known PAD, known CVA and HIV positive.     Patient currently denies symptoms.       PAST MEDICAL HISTORY:  Past Medical History   Diagnosis Date     Anemia 4/30/2012     Anxiety      CKD (chronic kidney disease) stage 3, GFR 30-59 ml/min 01/25/2012     COPD (chronic obstructive pulmonary disease) (H)      CVA (cerebral infarction) 4/30/2012     complete occlusion of the proximal left internal carotid artery with distal reconstitution via the left opthalmic artery     End stage kidney disease (H)      HIV (human immunodeficiency virus infection) (H) dx 4/16/1997     started meds 6/24/1997     HTN (hypertension)      Hx SBO 8/01/2012     recurrent     PVD (peripheral vascular disease) (H) 8/22/2006     Thrombocytosis (H) 5/01/2012     Wound dehiscence 5/01/2012       PAST SURGICAL HISTORY:  Past Surgical History   Procedure Laterality Date     Bypass graft iliac to femoral  4/5/2012     Right common iliac artery endarterectomy  4/2012     Exploratory laparotomy, small bowel resection  8/2012       FAMILY HISTORY:   No family history on file.    SOCIAL HISTORY:  Social History     Social History     Marital status: Single     Spouse name: N/A     Number of children: N/A     Years of education: N/A     Occupational History     Not on file.     Social History Main Topics     Smoking status: Current Some Day Smoker     Packs/day: 1.00  "    Years: 0.00     Types: Cigarettes     Smokeless tobacco: Never Used      Comment: stopped 2 weeks ago     Alcohol use No     Drug use: No     Sexual activity: Not on file     Other Topics Concern     Not on file     Social History Narrative        ALLERGIES:   Allergies   Allergen Reactions     No Known Drug Allergy        CURRENT MEDICATIONS:   Prescription Medications as of 3/17/2017             aspirin EC 81 MG EC tablet Take 1 tablet (81 mg) by mouth daily    pravastatin (PRAVACHOL) 20 MG tablet Take 1 tablet (20 mg) by mouth every evening    lamiVUDine (EPIVIR) 10 MG/ML solution 10mg/ml oral solution:  noah instructed to take as follows: 2.5ml by mouth every day.    diltiazem (CARTIA XT) 180 MG 24 hr CD capsule Take by mouth daily     rilpivirine (EDURANT) 25 MG tablet Take 2.5 mg by mouth daily     abacavir (ZIAGEN) 300 MG tablet Take 300 mg by mouth 2 times daily    metoprolol (TOPROL-XL) 50 MG 24 hr tablet Take 50 mg by mouth daily.          ROS:  Constitutional: No fever, chills, or sweats. No weight gain/loss.   HEENT: No visual disturbance, ear ache, epistaxis, sore throat.   Allergies/Immunologic: Negative.   Respiratory: No cough, hemoptysis.   Cardiovascular: As per HPI.   GI: No nausea, vomiting, hematemesis, melena, or hematochezia.   : No urinary frequency, dysuria, or hematuria.   Integument: No rash.   Psychiatric: No anxiety / depression.   Neuro: No speech disturbance, focal sensory or motor deficit.   Endocrinology: No polyuria / polyphagia.   Musculoskeletal: No myalgia.      PHYSICAL EXAMINATION:   /87 (BP Location: Left arm, Patient Position: Chair, Cuff Size: Adult Regular)  Pulse 93  Ht 1.753 m (5' 9\")  Wt 71.4 kg (157 lb 8 oz)  SpO2 95%  BMI 23.26 kg/m2  General: alert and oriented x 3, pleasant, no acute distress  CV: S1 S2, no murmurs, rubs or gallops, regular rate and rhythm, no peripheral edema, no carotid or abdomenal bruits, pulses in upper and lower extremities " palpable  Pulm: bilateral breath sounds, clear to auscultation, easy work of breathing  GI: (+) bowel sounds, soft non-tender and non-distended  : voiding without problems  MS: moves all extremities x 4,  5+/5+ equal strength bilaterally  Neuro: pupils equal round and reactive to light, cranial nerves, II-XII grossly intact, no gross neurologic deficits noted    LABS:  BMP RESULTS:  Lab Results   Component Value Date     03/09/2017    POTASSIUM 4.8 03/09/2017    CHLORIDE 98 03/09/2017    CO2 31 03/09/2017    ANIONGAP 11 03/09/2017    GLC 77 03/09/2017    BUN 34 (H) 03/09/2017    CR 5.00 (H) 03/09/2017    GFRESTIMATED 12 (L) 03/09/2017    GFRESTBLACK 14 (L) 03/09/2017    WINTER 8.8 03/09/2017        CBC RESULTS:  Lab Results   Component Value Date    WBC 5.1 03/09/2017    RBC 3.54 (L) 03/09/2017    HGB 12.0 (L) 03/09/2017    HCT 36.6 (L) 03/09/2017     (H) 03/09/2017    MCH 33.9 (H) 03/09/2017    MCHC 32.8 03/09/2017    RDW 16.6 (H) 03/09/2017     03/09/2017       Lab Results   Component Value Date    INR 1.21 (H) 03/09/2017     Lab Results   Component Value Date    PTT 31 03/09/2017     Lab Results   Component Value Date    UA A:24 B:37 44 49 76 DR:8 DQ:5 07/14/2016     Lab Results   Component Value Date    A1C 5.2 03/17/2014       PROCEDURES/IMAGING:  LEXISCAN: 01/31/17  Findings:  1. Overall quality of the study: Diagnostic.   2. Left ventricular cavity is mildly dilated on the rest and stress studies.  3. SPECT images demonstrate medium to large sized, moderate intensity, partially reversible perfusion abnormality in the basal to mid inferior and inferolateral myocardial segments. These results suggest a high post-scan likelihood significant coronary artery disease. The summed stress score is 8.   4. Gated SPECT images reveal normal left ventricular systolic function with a basal to mid inferior and inferolateral hypokinetic myocardial segment.Left ventricular ejection fraction is 59%. Left  ventricular end-diastolic volume is 158 mL. End-systolic volume is 65 mL.       Impression:  1. Abnormal myocardial SPECT study with a summed stress score of 8. A summed stress score of 8 is associated with an annual event rate of 2.5% and 1% for myocardial infarction and cardiac death, respectively (Ajit. Circulation 1998;98:535-43).   2. Perfusion abnormality consistent with prior infarct in the basal to mid inferior and inferolateral wall with moderate malathi-infarct ischemia (most likely left circumflex coronary artery territory).  3. LVEF calculated at 59%.  4. No prior study available for comparison..        CORONARY ANGIOGRAM: 03/14/17  Mauricio Galo is a 61 year old male with ESRD on HD, with risk factor profile (+) HTN, (-) DM, (+) HLP, (+) Current 80+ pack-year tobacco use, (-) fam Hx premature CAD, with HIV, HCV, here on an elective outpatient basis after abnormal stress echo (infarct/malathi-infarct ischemic in the basal to mid inf/inferolateral segments) for kidney transplant workup.      See coronary section for angiogram results  See hemodynamics for FFR     Access: LCFV (has ileofemoral bypass on right)     SUMMARY:   Three vessel CAD RCA, LAD and LCX  FFR of distal LAD 0.77  FFR of distal LPDA 0.81      PLAN:   1. CV surgery has been consulted. Discussed with Dr. Merino. Decent targets are distal LAD, OM4, OM5, and LPDA. The large D1 does not need a bypass graft.  2. Bedrest per protocol.  3. Initiate aspirin 81 mg and pravastatin 20 mg. Aggressive lifestyle modification.   4. Discharge today per protocol        ASSESSMENT/PLAN:     Mauricio is a 62 year old male who presents for cardiac stratification maintain renal Tx waitlist status. Patient,s Lexiscan echocardiogram was abnormal. Patient then had coronary angiogram and was found to have severe 3 vessel coronary artery disease.      Recommend CABG but he would be at increased risk due to CKD stage 5 of unclear etiology (on dialysis), no DM,  heavy smoker (80+ pack-year tobacco use), known PAD, known CVA and HIV positive.  Patient is concerned about surgery with his ongoing medical conditions and would like to discuss with his cardiologist about stenting option.    Meanwhile ask patient to quit smoking. Wait for his visit and discussion with his cardiologist.     Approximately 50 minutes spent with this case including review of the clinical history and data; discussion with the patient and his family and coordination of the care     Thank you for including me in the care of this kind patient. Do not hesitate to contact me with any questions.     Dr. Arvin Merino   Cardiothoracic Surgery  [phone] pager  [phone] office    CC  Patient Care Team:  Adam Cordova MD as PCP - General (Infectious Diseases)  Didier Pugh MD as MD (Internal Medicine)  ADAM CORDOVA

## 2017-03-20 NOTE — TELEPHONE ENCOUNTER
Message left for patient on voicemail: Coordinator called pt to let patient know that  active status was changed to inactive on kidney wait list d/t pending cardiac clearance.  Explained patietn will not be eligible for kidney offers while inactive, but will still accumulate wait time. Contact information provided.    UNOS updated, immunology & PFR notified, change in status letter routed to admin team to send out.

## 2017-03-20 NOTE — LETTER
March 20, 2017    Mauricio Galo  [address]   [facility] [address]      Dear Mr. Galo,    This letter is sent to notify you that your listing status was changed from Active to Inactive on the kidney transplant waitlist at the [facility].  Your status was changed pending cardiac clearance.    During this waiting period, we may still request periodic laboratory tests with your primary physician.  It will be your responsibility to remind your physician to forward your results to the Transplant Office.    We still need to be kept informed of any changes such as:    o changes in your insurance coverage  o change in your phone number, address or emergency contact  o significant changes in your health  o significant infections (such as pneumonia or abscesses)  o blood transfusions  o any condition which requires hospitalization or surgery    Sincerely,        Kidney Transplant Program    Enclosures: Telephone Contact List, UNOS Letter, Waitlist Information Update and While You Are Waiting    CC: MD Jasbir Burroughs MD [facility]

## 2017-03-22 NOTE — TELEPHONE ENCOUNTER
Team Conference:      Attendees:Dr. Hernandez, Dr Minaya, Dr Hernadez, Coordinator, , Dietician, Pharmacy    Discussion and Outcome: Patient status changed to Inactive approved.

## 2017-04-06 PROBLEM — I25.118 CORONARY ARTERY DISEASE OF NATIVE ARTERY OF NATIVE HEART WITH STABLE ANGINA PECTORIS (H): Status: ACTIVE | Noted: 2017-01-01

## 2017-04-06 NOTE — TELEPHONE ENCOUNTER
Previsit nursing summary    HPI: Mauricio is a 62 year old male who presents with presented for cardiac stratification maintain renal Tx waitlist status. Patient,s Lexiscan echocardiogram was abnormal. Patient then had coronary angiogram and was found to have severe 3 vessel coronary artery disease. Patient's history includes, CKD stage 5 of unclear etiology (on dialysis), no DM, heavy smoker (80+ pack-year tobacco use), known PAD, known CVA and HIV positive. Patient currently denies symptoms.    Procedures:    LEXISCAN (01/31/2017)  Findings:  1. Overall quality of the study: Diagnostic.   2. Left ventricular cavity is mildly dilated on the rest and stress studies.  3. SPECT images demonstrate medium to large sized, moderate intensity, partially reversible perfusion abnormality in the basal to mid inferior and inferolateral myocardial segments. These results suggest a high post-scan likelihood significant coronary artery disease. The summed stress score is 8.   4. Gated SPECT images reveal normal left ventricular systolic function with a basal to mid inferior and inferolateral hypokinetic myocardial segment.Left ventricular ejection fraction is 59%. Left ventricular end-diastolic volume is 158 mL. End-systolic volume is 65 mL.       Impression:  1. Abnormal myocardial SPECT study with a summed stress score of 8. A summed stress score of 8 is associated with an annual event rate of 2.5% and 1% for myocardial infarction and cardiac death, respectively (Ajit. Circulation 1998;98:535-43).   2. Perfusion abnormality consistent with prior infarct in the basal to mid inferior and inferolateral wall with moderate malathi-infarct ischemia (most likely left circumflex coronary artery territory).  3. LVEF calculated at 59%.  4. No prior study available for comparison..      CORONARY ANGIOGRAM (03/14/2017)  1. Heavily calcified coronary arteries.   2. Both coronary arteries arise from their respective cusps.  3. Dominance:  Balanced  4. The LM is heavily calcified and has a 25% stenosis.  5. LAD: Type 3 [LAD supplies the entire apex]. Heavily calcified to the mid LAD. The LAD gives rise to septal perforators, a large D1 and small D2. The pLAD has a <25% stenosis, mLAD has a 60% stenosis at the septal  then a 60%, dLAD has <25% disease. D1 has 40% stenosis.  6. LCX is heavily calcified and gives rise to 3 small OM vessels, a large OM4 and an OM5 vessel. The pLCx has a <25% stenosis, mLCx has a 40-50% stenosis, dLCx has a <25% stenosis, OM4 has an 80% ostial lesion. The LPDA as 25% stenosis. The LPDA has 25% stenosis. The AV groove Cx has 60%. There is a moderate sized OM branch that is occluded at the ostium and emerges from the LCx just beyond OM4 and fills by left to left collaterals.  7. RCA is heavily calcified. The pRCA is 100% occluded proximally. There are left to right collaterals coming from the distal LCx to the RV marginals.      FUNCTIONAL ASSESSMENT:  Adequate anticoagulation was was obtained with IV UFH.  FFR distal LAD-- A Radi Aeris wire was passed across the lesion. Maximal hyperemia was induced with IC Adenosine (80 mcg). The FFR at peak hyperemia was 0.77  FFR distal LPDA-- A Radi Aeris wire was passed across the lesion. Maximal hyperemia was induced with IC Adenosine (80 mcg). The FFR at peak hyperemia was 0.81    Dobutamine ECHO (1/3/2017)  Non-diagnostic dobutamine stress echocardiogram due to inability to achieve  target heart rate (beta-blockers on the day of the test). The patient  achieved only 71% of max predicted target heart rate.  Consider pharmacological stress test (adenosine myocardial perfusion imaging  study) if clinically indicated.  Normal resting LVEF of approximately 55-60%.    Dobutamine ECHO (12/7/2015)  Interpretation Summary  Non-diagnostic dobutamine stress test due to inability to achieve target  heart rate (patient took beta-blockers on the day of the test). Consider  repeat  dobutamine stress off beta-blockers or a vasodilator stress test with  SPECT or MRI. Grossly normal baseline echocardiogram (see comments below).    Dobutamine ECHO (8/8/2013)  The EKG portion of this stress test was negative for inducible ischemia (see   echo results below).  This was a normal stress echocardiogram.  The visual ejection fraction is estimated at >70%.    NICS (8/5/2013)  1. Right side:  a. There is a mild amount of plaque at the bifurcation and a  moderate amount of plaque involving the proximal external carotid  artery and a mild amount of plaque involving the proximal, mid, and  distal internal carotid artery.  b. Less than 50% stenosis of the right internal carotid artery by  velocity criteria.      2. Left side:  a. There is a moderate amount of plaque at the level of the  bifurcation with extension to the proximal external carotid artery.  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  b. Total occlusion of the left internal carotid artery.      3. Heterogeneously echogenic nodule in the left lobe of the thyroid  gland measuring 1.2 x 1.8 x 1.7 cm. Consider further evaluation with  a dedicated ultrasound of the thyroid gland.      4. Simple cyst in the left lobe of the thyroid gland measuring 0.7 x  0.4 x 0.8 cm.    ECHO (7/24/2013)  1. Hyperdynamic LV systolic function. LVH.  Stage 1 diastolic dysfunction 2.   Moderate dynamic LVOT obstruction, differential is hyperdynamic states versus   hypertrophic cardiomyopathy(less likely as no other morphologic evidence) 3.    Normal RV size and function  4. No signficant valvular abnormalities

## 2017-04-12 NOTE — LETTER
April 18, 2017    Mauricio Galo  [address]   [facility] [address]      Dear Mauricio Galo,    [facility] Transplant scheduling office has been trying to reach you to schedule your waitlist appointments.    Our transplant team has not been able to contact you at: [phone]    Please call  [phone] so we can arrange this important visit.  We look forward to hearing from you.      Thank you,    [facility]  [facility]

## 2017-04-13 NOTE — TELEPHONE ENCOUNTER
Left voicemail for Mauricio to return my call to get his cardiology appointment he missed rescheduled.

## 2017-04-18 ENCOUNTER — TELEPHONE (OUTPATIENT)
Dept: TRANSPLANT | Facility: CLINIC | Age: 62
End: 2017-04-18
